# Patient Record
Sex: FEMALE | Race: WHITE | NOT HISPANIC OR LATINO | Employment: OTHER | URBAN - METROPOLITAN AREA
[De-identification: names, ages, dates, MRNs, and addresses within clinical notes are randomized per-mention and may not be internally consistent; named-entity substitution may affect disease eponyms.]

---

## 2019-05-20 ENCOUNTER — CONSULT (OUTPATIENT)
Dept: CARDIOLOGY | Facility: CLINIC | Age: 79
End: 2019-05-20

## 2019-05-20 VITALS
DIASTOLIC BLOOD PRESSURE: 64 MMHG | WEIGHT: 200 LBS | HEIGHT: 60 IN | BODY MASS INDEX: 39.27 KG/M2 | HEART RATE: 71 BPM | SYSTOLIC BLOOD PRESSURE: 122 MMHG

## 2019-05-20 DIAGNOSIS — I65.22 CAROTID ARTERY STENOSIS, SYMPTOMATIC, LEFT: ICD-10-CM

## 2019-05-20 DIAGNOSIS — I65.23 CAROTID ARTERY STENOSIS, ASYMPTOMATIC, BILATERAL: ICD-10-CM

## 2019-05-20 DIAGNOSIS — I48.0 PAROXYSMAL ATRIAL FIBRILLATION (HCC): Primary | ICD-10-CM

## 2019-05-20 DIAGNOSIS — I25.10 CORONARY ARTERY DISEASE INVOLVING NATIVE HEART, ANGINA PRESENCE UNSPECIFIED, UNSPECIFIED VESSEL OR LESION TYPE: Primary | ICD-10-CM

## 2019-05-20 PROCEDURE — 99204 OFFICE O/P NEW MOD 45 MIN: CPT | Performed by: INTERNAL MEDICINE

## 2019-05-20 PROCEDURE — 93000 ELECTROCARDIOGRAM COMPLETE: CPT | Performed by: INTERNAL MEDICINE

## 2019-05-20 RX ORDER — FOLIC ACID 1 MG/1
1 TABLET ORAL 2 TIMES DAILY
COMMUNITY

## 2019-05-20 RX ORDER — ATORVASTATIN CALCIUM 10 MG/1
10 TABLET, FILM COATED ORAL DAILY
Qty: 30 TABLET | Refills: 11 | Status: SHIPPED | OUTPATIENT
Start: 2019-05-20 | End: 2020-06-01

## 2019-05-20 RX ORDER — PREDNISONE 1 MG/1
5 TABLET ORAL DAILY
COMMUNITY

## 2019-05-20 RX ORDER — LANOLIN ALCOHOL/MO/W.PET/CERES
100 CREAM (GRAM) TOPICAL DAILY
COMMUNITY

## 2019-05-20 RX ORDER — LANOLIN ALCOHOL/MO/W.PET/CERES
5000 CREAM (GRAM) TOPICAL DAILY
COMMUNITY

## 2019-05-20 RX ORDER — TRIAMTERENE AND HYDROCHLOROTHIAZIDE 37.5; 25 MG/1; MG/1
1 TABLET ORAL DAILY
COMMUNITY
End: 2020-08-06 | Stop reason: HOSPADM

## 2019-05-20 RX ORDER — FLECAINIDE ACETATE 50 MG/1
50 TABLET ORAL EVERY 12 HOURS
COMMUNITY
End: 2019-07-02 | Stop reason: SDUPTHER

## 2019-05-20 NOTE — PROGRESS NOTES
Subjective:     Encounter Date:05/20/2019    Primary Care Physician: Jed Harris MD      Patient ID: Avnai Argueta is a 78 y.o. female.    Chief Complaint:Atrial Fibrillation (Consult)    PROBLEM LIST:  1. Paroxysmal atrial fibrillation  a. Diagnosed 2016, CHADSVASC- 4  b. 2016 cardiac cath with near normal coronaries.  EF of 65%.  c. Flecainide and Xarelto since 2016  2. RBBB noted 2017  3. Carotid artery disease  a. Right CEA  4. Hypertension  5. Dyslipidemia  6. Skin cancer with removal  7. GERD  8. Arthritis  9. Brite's disease  10. Temporal arteritis  11. Surgeries:  a. Cholecystectomy  b. Total hysterectomy       Allergies   Allergen Reactions   • Penicillins Rash         Current Outpatient Medications:   •  ASTAXANTHIN PO, Take  by mouth Daily., Disp: , Rfl:   •  flecainide (TAMBOCOR) 50 MG tablet, Take 50 mg by mouth Every 12 (Twelve) Hours., Disp: , Rfl:   •  folic acid (FOLVITE) 1 MG tablet, Take 1 mg by mouth 2 (Two) Times a Day., Disp: , Rfl:   •  KRILL OIL PO, Take  by mouth Daily., Disp: , Rfl:   •  LOSARTAN POTASSIUM PO, Take 50 mg by mouth Daily., Disp: , Rfl:   •  Multiple Vitamins-Iron (CHLORELLA PO), Take  by mouth Daily., Disp: , Rfl:   •  Multiple Vitamins-Minerals (MULTIVITAMIN ADULT PO), Take  by mouth Daily., Disp: , Rfl:   •  predniSONE (DELTASONE) 5 MG tablet, Take 5 mg by mouth Daily., Disp: , Rfl:   •  rivaroxaban (XARELTO) 20 MG tablet, Take 20 mg by mouth Daily., Disp: , Rfl:   •  triamterene-hydrochlorothiazide (MAXZIDE-25) 37.5-25 MG per tablet, Take 1 tablet by mouth Daily., Disp: , Rfl:   •  UBIQUINOL PO, Take  by mouth Daily., Disp: , Rfl:   •  vitamin B-12 (CYANOCOBALAMIN) 1000 MCG tablet, Take 5,000 mcg by mouth Daily., Disp: , Rfl:   •  vitamin B-6 (PYRIDOXINE) 50 MG tablet, Take 100 mg by mouth Daily., Disp: , Rfl:         History of Present Illness    Patient is a 78-year-old  female who we are seeing today for establishment of cardiac care secondary to history  of paroxysmal atrial fibrillation.  This was diagnosed in 2016.  Patient notes that since that time she has been maintained on flecainide and Xarelto therapy.  She denies any recurrent atrial fibrillation.  She denies any chest pain, pressure, tightness.  She denies any increased shortness of breath.  She does note lower extremity edema which was progressively worse throughout the day.  She also notes a significant amount of fluid intake.  She denies any syncope or near syncope but did have an episode of room spinning.  This lasted for a little while and then eventually resolved.  She notes that her previous cardiologist Dr. Cervantes has retired and she discussed with her primary care physician establishing care here again.    The following portions of the patient's history were reviewed and updated as appropriate: allergies, current medications, past family history, past medical history, past social history, past surgical history and problem list.    Family History   Problem Relation Age of Onset   • Stroke Mother    • Diabetes Mother    • Heart disease Father    • Alcohol abuse Father    • Heart disease Sister        Social History     Tobacco Use   • Smoking status: Never Smoker   • Smokeless tobacco: Never Used   Substance Use Topics   • Alcohol use: No     Frequency: Never   • Drug use: No         Review of Systems   Constitution: Positive for malaise/fatigue. Negative for fever and weakness.   HENT: Positive for hearing loss and tinnitus. Negative for nosebleeds.    Eyes: Negative for redness and visual disturbance.   Cardiovascular: Positive for leg swelling. Negative for orthopnea, palpitations and paroxysmal nocturnal dyspnea.   Respiratory: Positive for cough, shortness of breath and snoring. Negative for sputum production and wheezing.    Endocrine: Positive for heat intolerance.   Hematologic/Lymphatic: Negative for bleeding problem.   Skin: Negative for flushing, itching and rash.   Musculoskeletal:  "Positive for arthritis. Negative for falls, joint pain and muscle cramps.   Gastrointestinal: Positive for heartburn. Negative for abdominal pain, diarrhea, nausea and vomiting.   Genitourinary: Negative for hematuria.   Neurological: Negative for excessive daytime sleepiness, dizziness, headaches and tremors.   Psychiatric/Behavioral: Negative for substance abuse. The patient is not nervous/anxious.           Objective:    height is 152.4 cm (60\") and weight is 90.7 kg (200 lb). Her blood pressure is 122/64 and her pulse is 71.         Physical Exam   Constitutional: She is oriented to person, place, and time. She appears well-developed and well-nourished.   HENT:   Head: Normocephalic and atraumatic.   Mouth/Throat: Oropharynx is clear and moist.   Eyes: Conjunctivae are normal. Pupils are equal, round, and reactive to light.   Neck: Normal carotid pulses and no JVD present. Carotid bruit is not present. No thyromegaly present.   Cardiovascular: Normal rate, regular rhythm, S1 normal and S2 normal. Exam reveals no gallop and no friction rub.   No murmur heard.  Pulses:       Carotid pulses are 2+ on the right side, and 2+ on the left side.       Dorsalis pedis pulses are 2+ on the right side, and 2+ on the left side.        Posterior tibial pulses are 2+ on the right side, and 2+ on the left side.   Pulmonary/Chest: No respiratory distress. She has no wheezes. She has no rales. She exhibits no tenderness.   Abdominal: She exhibits no distension, no abdominal bruit and no mass. There is no hepatosplenomegaly. There is no tenderness. There is no rebound.   Musculoskeletal: She exhibits edema. She exhibits no tenderness or deformity.   Lymphadenopathy:     She has no cervical adenopathy.   Neurological: She is alert and oriented to person, place, and time. She has normal strength.   Skin: Skin is warm and dry. No rash noted. No cyanosis. Nails show no clubbing.   Psychiatric: She has a normal mood and affect. " Cognition and memory are normal.         ECG 12 Lead  Date/Time: 5/20/2019 12:58 PM  Performed by: Owen Olivera MD  Authorized by: Owen Olivera MD   Rhythm: sinus rhythm  Conduction: right bundle branch block                  Assessment:   Assessment/Plan      Avani was seen today for atrial fibrillation.    Diagnoses and all orders for this visit:    Paroxysmal atrial fibrillation (CMS/HCC)    Carotid artery disease    Other orders  -     ECG 12 Lead      1.  Paroxysmal atrial fibrillation.  Well-controlled on flecainide and chronically anticoagulated with Xarelto.  Asymptomatic.  2.  Hypertension well-controlled on losartan  3.  Carotid artery disease status post right CEA.  Carotid duplex last year showing 50 to 69% asymptomatic left ICA.  4.  Dyslipidemia not on statin (prefers not to take prescription medicines).  5.  Lower extremity edema    Recommendations #1 continue current medications for atrial fibrillation hypertension  2.  Check carotid duplex prior to next visit.  Will follow for now.  No indications for revascularization as of yet.  3.  Dyslipidemia untreated last LDL cholesterol 170 with known vascular disease.  Long discussion today regarding the numerous studies showing stroke and MI mortality benefit with statins.  We will have her start atorvastatin 10 mg nightly, which she is agreeable to..  Also had a long discussion regarding dietary modifications, and the likelihood of success,  4.  Lower extremity edema, appears to be due to her significant free water intake.  Instead of increasing diuretics, we will simply decrease her p.o. intake.  No evidence of heart failure    Tana ELDER scribed portions of this dictation for Dr. Owen Olivera.  I, Owen Olivera MD, personally performed the services described in this documentation as scribed by the above individual in my presence, and it is both accurate and complete    Dictated utilizing Dragon dictation

## 2019-07-02 RX ORDER — FLECAINIDE ACETATE 50 MG/1
50 TABLET ORAL EVERY 12 HOURS
Qty: 90 TABLET | Refills: 3 | Status: SHIPPED | OUTPATIENT
Start: 2019-07-02 | End: 2020-01-24

## 2020-01-24 RX ORDER — FLECAINIDE ACETATE 50 MG/1
50 TABLET ORAL EVERY 12 HOURS
Qty: 180 TABLET | Refills: 1 | Status: SHIPPED | OUTPATIENT
Start: 2020-01-24 | End: 2020-07-21

## 2020-01-24 RX ORDER — FLECAINIDE ACETATE 50 MG/1
TABLET ORAL
Qty: 90 TABLET | Refills: 2 | Status: SHIPPED | OUTPATIENT
Start: 2020-01-24 | End: 2020-01-24 | Stop reason: SDUPTHER

## 2020-02-24 DIAGNOSIS — I65.23 CAROTID ARTERY STENOSIS, ASYMPTOMATIC, BILATERAL: Primary | ICD-10-CM

## 2020-06-01 ENCOUNTER — HOSPITAL ENCOUNTER (OUTPATIENT)
Dept: CARDIOLOGY | Facility: HOSPITAL | Age: 80
Discharge: HOME | End: 2020-06-01
Admitting: INTERNAL MEDICINE

## 2020-06-01 ENCOUNTER — OFFICE VISIT (OUTPATIENT)
Dept: CARDIOLOGY | Facility: CLINIC | Age: 80
End: 2020-06-01

## 2020-06-01 VITALS
DIASTOLIC BLOOD PRESSURE: 56 MMHG | BODY MASS INDEX: 38.48 KG/M2 | HEIGHT: 60 IN | SYSTOLIC BLOOD PRESSURE: 130 MMHG | WEIGHT: 196 LBS | HEART RATE: 64 BPM | OXYGEN SATURATION: 96 %

## 2020-06-01 VITALS — HEIGHT: 60 IN | BODY MASS INDEX: 39.27 KG/M2 | WEIGHT: 200 LBS

## 2020-06-01 DIAGNOSIS — M79.89 LEG SWELLING: ICD-10-CM

## 2020-06-01 DIAGNOSIS — I10 ESSENTIAL HYPERTENSION: ICD-10-CM

## 2020-06-01 DIAGNOSIS — I48.0 PAROXYSMAL ATRIAL FIBRILLATION (HCC): Primary | ICD-10-CM

## 2020-06-01 DIAGNOSIS — E78.5 DYSLIPIDEMIA: ICD-10-CM

## 2020-06-01 DIAGNOSIS — I65.23 CAROTID ARTERY STENOSIS, ASYMPTOMATIC, BILATERAL: ICD-10-CM

## 2020-06-01 DIAGNOSIS — I77.9 RIGHT-SIDED CAROTID ARTERY DISEASE, UNSPECIFIED TYPE (HCC): ICD-10-CM

## 2020-06-01 PROBLEM — K21.9 GERD (GASTROESOPHAGEAL REFLUX DISEASE): Status: ACTIVE | Noted: 2020-06-01

## 2020-06-01 PROBLEM — M19.90 ARTHRITIS: Status: ACTIVE | Noted: 2020-06-01

## 2020-06-01 PROBLEM — I45.10 RBBB: Status: ACTIVE | Noted: 2020-06-01

## 2020-06-01 LAB
BH CV XLRA MEAS LEFT DIST CCA EDV: 10.8 CM/SEC
BH CV XLRA MEAS LEFT DIST CCA PSV: 77.1 CM/SEC
BH CV XLRA MEAS LEFT DIST ICA EDV: 15.3 CM/SEC
BH CV XLRA MEAS LEFT DIST ICA PSV: 66.8 CM/SEC
BH CV XLRA MEAS LEFT ICA/CCA RATIO: 2.04
BH CV XLRA MEAS LEFT MID CCA EDV: 11.8 CM/SEC
BH CV XLRA MEAS LEFT MID CCA PSV: 92.3 CM/SEC
BH CV XLRA MEAS LEFT MID ICA EDV: 13.5 CM/SEC
BH CV XLRA MEAS LEFT MID ICA PSV: 64.6 CM/SEC
BH CV XLRA MEAS LEFT PROX CCA EDV: 11.9 CM/SEC
BH CV XLRA MEAS LEFT PROX CCA PSV: 117.3 CM/SEC
BH CV XLRA MEAS LEFT PROX ECA EDV: 0.79 CM/SEC
BH CV XLRA MEAS LEFT PROX ECA PSV: 98.2 CM/SEC
BH CV XLRA MEAS LEFT PROX ICA EDV: 25.5 CM/SEC
BH CV XLRA MEAS LEFT PROX ICA PSV: 188 CM/SEC
BH CV XLRA MEAS LEFT PROX SCLA EDV: 0.7 CM/SEC
BH CV XLRA MEAS LEFT PROX SCLA PSV: 114.5 CM/SEC
BH CV XLRA MEAS LEFT VERTEBRAL A EDV: 16.6 CM/SEC
BH CV XLRA MEAS LEFT VERTEBRAL A PSV: 76.8 CM/SEC
BH CV XLRA MEAS RIGHT DIST CCA EDV: 10.1 CM/SEC
BH CV XLRA MEAS RIGHT DIST CCA PSV: 54.4 CM/SEC
BH CV XLRA MEAS RIGHT DIST ICA EDV: 26.5 CM/SEC
BH CV XLRA MEAS RIGHT DIST ICA PSV: 118 CM/SEC
BH CV XLRA MEAS RIGHT ICA/CCA RATIO: 1.09
BH CV XLRA MEAS RIGHT MID CCA EDV: 11.8 CM/SEC
BH CV XLRA MEAS RIGHT MID CCA PSV: 69.2 CM/SEC
BH CV XLRA MEAS RIGHT MID ICA EDV: 16.2 CM/SEC
BH CV XLRA MEAS RIGHT MID ICA PSV: 75.5 CM/SEC
BH CV XLRA MEAS RIGHT PROX CCA EDV: 11.9 CM/SEC
BH CV XLRA MEAS RIGHT PROX CCA PSV: 92.4 CM/SEC
BH CV XLRA MEAS RIGHT PROX ECA EDV: 66.2 CM/SEC
BH CV XLRA MEAS RIGHT PROX ECA PSV: 725 CM/SEC
BH CV XLRA MEAS RIGHT PROX ICA EDV: 10.9 CM/SEC
BH CV XLRA MEAS RIGHT PROX ICA PSV: 73.3 CM/SEC
BH CV XLRA MEAS RIGHT PROX SCLA EDV: 1.3 CM/SEC
BH CV XLRA MEAS RIGHT PROX SCLA PSV: 228.8 CM/SEC
BH CV XLRA MEAS RIGHT VERTEBRAL A EDV: 9.8 CM/SEC
BH CV XLRA MEAS RIGHT VERTEBRAL A PSV: 72.3 CM/SEC
RIGHT ARM BP: NORMAL MMHG

## 2020-06-01 PROCEDURE — 93880 EXTRACRANIAL BILAT STUDY: CPT | Performed by: INTERNAL MEDICINE

## 2020-06-01 PROCEDURE — 93880 EXTRACRANIAL BILAT STUDY: CPT

## 2020-06-01 PROCEDURE — 99214 OFFICE O/P EST MOD 30 MIN: CPT | Performed by: INTERNAL MEDICINE

## 2020-06-01 PROCEDURE — 93000 ELECTROCARDIOGRAM COMPLETE: CPT | Performed by: INTERNAL MEDICINE

## 2020-06-01 RX ORDER — CURCUMIN 100 %
1 POWDER (GRAM) MISCELLANEOUS DAILY
COMMUNITY

## 2020-06-01 NOTE — PROGRESS NOTES
[facility]  Subjective:     Encounter Date:06/01/2020      Patient ID: Avani Argueta is a  79 y.o. female.    Chief Complaint: Atrial Fibrillation      PROBLEM LIST:  1. Paroxysmal atrial fibrillation:  a. Diagnosed 2016, CHADSVASC- 4 (HTN, Age >75, Female)  b. [facility], 04/13/2016, Dr. Love: Near normal coronaries. EF 65%.  c. Flecainide and Xarelto since 2016.  2. RBBB, noted 2017  3. Carotid artery disease:  a. Right CEA in 2012.  b. Carotid duplex, 06/01/2020 right 0 to 49%, left 50 to 69%.  4. Hypertension  5. Dyslipidemia  6. Skin cancer with removal  7. GERD  8. Arthritis  9. Brite's disease  10. Temporal arteritis with chronic prednisone therapy.  11. Surgeries:  a. Cholecystectomy.  b. Total hysterectomy.   c. Right CEA 2012.    History of Present Illness  Avani Argueta returns today for an annual follow up with a history of paroxysmal atrial fibrillation, carotid disease, and cardiac risk factors. Since last visit, he has been feeling well overall from a cardiovascular standpoint. She does report occasional high heart rates due to anxiety. Patient denies any awareness of atrial fibrillation. She feels that she would recognize the feeling immediately. Patient denies chest pain, shortness of breath, dizziness, and syncope. Patient takes Maxzide in the late afternoon for her edema. She has not swelling when she first wakes up in the morning, but it starts in the mid morning and gradually worsens throughout the day.    Allergies   Allergen Reactions   • Penicillins Rash         Current Outpatient Medications:   •  ASTAXANTHIN PO, Take  by mouth Daily., Disp: , Rfl:   •  flecainide (TAMBOCOR) 50 MG tablet, Take 1 tablet by mouth Every 12 (Twelve) Hours., Disp: 180 tablet, Rfl: 1  •  folic acid (FOLVITE) 1 MG tablet, Take 1 mg by mouth 2 (Two) Times a Day., Disp: , Rfl:   •  KRILL OIL PO, Take 2 capsules by mouth Daily., Disp: , Rfl:   •  LOSARTAN POTASSIUM PO, Take 50  mg by mouth Daily., Disp: , Rfl:   •  Multiple Vitamins-Iron (CHLORELLA PO), Take 5 tablets by mouth Daily., Disp: , Rfl:   •  Multiple Vitamins-Minerals (MULTIVITAMIN ADULT PO), Take  by mouth Daily., Disp: , Rfl:   •  predniSONE (DELTASONE) 5 MG tablet, Take 5 mg by mouth Daily., Disp: , Rfl:   •  Probiotic Product (PROBIOTIC PO), Take 1 tablet by mouth Daily., Disp: , Rfl:   •  rivaroxaban (XARELTO) 20 MG tablet, Take 1 tablet by mouth Daily., Disp: 90 tablet, Rfl: 3  •  triamterene-hydrochlorothiazide (MAXZIDE-25) 37.5-25 MG per tablet, Take 1 tablet by mouth Daily., Disp: , Rfl:   •  Turmeric, Curcuma Longa, (CURCUMIN) powder, 1 tablet Daily. 1 tablet daily, Disp: , Rfl:   •  UBIQUINOL PO, Take  by mouth Daily., Disp: , Rfl:   •  Unable to find, 1 each Daily. Med Name: complete spare restore, Disp: , Rfl:   •  vitamin B-12 (CYANOCOBALAMIN) 1000 MCG tablet, Take 5,000 mcg by mouth Daily., Disp: , Rfl:   •  vitamin B-6 (PYRIDOXINE) 50 MG tablet, Take 100 mg by mouth Daily., Disp: , Rfl:     The following portions of the patient's history were reviewed and updated as appropriate: allergies, current medications, past family history, past medical history, past social history, past surgical history and problem list.    Review of Systems   Constitution: Negative. Negative for malaise/fatigue.   Eyes: Negative for vision loss in left eye and vision loss in right eye.   Cardiovascular: Positive for leg swelling and palpitations. Negative for chest pain, dyspnea on exertion, near-syncope, orthopnea, paroxysmal nocturnal dyspnea and syncope.   Respiratory: Negative.    Hematologic/Lymphatic: Negative for bleeding problem. Does not bruise/bleed easily.   Skin: Negative for rash.   Musculoskeletal: Negative for muscle weakness and myalgias.   Gastrointestinal: Negative for heartburn, nausea and vomiting.   Neurological: Negative for brief paralysis, excessive daytime sleepiness, focal weakness, numbness, paresthesias and  "weakness.          Objective:     Vitals:    06/01/20 1138   BP: 130/56   BP Location: Right arm   Patient Position: Sitting   Pulse: 64   SpO2: 96%   Weight: 88.9 kg (196 lb)   Height: 152.4 cm (60\")         Physical Exam   Constitutional: She is oriented to person, place, and time. She appears well-developed and well-nourished.   HENT:   Mouth/Throat: Oropharynx is clear and moist.   Neck: No JVD present. Carotid bruit is not present. No thyromegaly present.   Cardiovascular: Regular rhythm, S1 normal, S2 normal, normal heart sounds and intact distal pulses. Exam reveals no gallop, no S3 and no S4.   No murmur heard.  Pulses:       Carotid pulses are 2+ on the right side, and 2+ on the left side.       Radial pulses are 2+ on the right side, and 2+ on the left side.   Pulmonary/Chest: Breath sounds normal.   Abdominal: Soft. Bowel sounds are normal. She exhibits no mass. There is no tenderness.   Musculoskeletal: She exhibits no edema.   Neurological: She is alert and oriented to person, place, and time.   Skin: Skin is warm and dry. No rash noted.       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    Diagnoses and all orders for this visit:    Paroxysmal atrial fibrillation (CMS/HCC)  -     ECG 12 Lead    Right-sided carotid artery disease, unspecified type (CMS/HCC)    Essential hypertension    Dyslipidemia    Leg " swelling        Impression:  1. Paroxysmal atrial fibrillation, maintaining sinus rhythm on flecainide 50 mg BID. On Xarelto 20 mg for stroke prophylaxis.  2. Carotid disease, s/p right carotid endarterectomy in 2012. Carotid duplex from today showed moderate left disease and widely patent previous right CEA  3. Hypertension, well-controlled on current medications.  4. Dyslipidemia, monitored by PCP.  5. Leg swelling, on Maxzide 37.5-25 mg daily.    Plan:  1. Stable cardiac status.   2. Compression stockings recommended for leg swelling.  3. Carotid artery disease, stable asymptomatic.  Would like for her to resume statin.  4. Continue current medications.  5. Revisit in 12 MO, or sooner as needed.    Scribed for Owen Olivera MD by Chioma Foster. 6/1/2020  12:11    Owen Olivera MD      Please note that portions of this note may have been completed with a voice recognition program. Efforts were made to edit the dictations, but occasionally words are mistranscribed.

## 2020-07-21 RX ORDER — RIVAROXABAN 20 MG/1
TABLET, FILM COATED ORAL
Qty: 90 TABLET | Refills: 2 | Status: SHIPPED | OUTPATIENT
Start: 2020-07-21 | End: 2021-04-20

## 2020-07-21 RX ORDER — FLECAINIDE ACETATE 50 MG/1
TABLET ORAL
Qty: 180 TABLET | Refills: 0 | Status: SHIPPED | OUTPATIENT
Start: 2020-07-21 | End: 2020-10-29

## 2020-08-03 ENCOUNTER — HOSPITAL ENCOUNTER (OUTPATIENT)
Facility: HOSPITAL | Age: 80
Setting detail: OBSERVATION
Discharge: HOME | End: 2020-08-06
Attending: EMERGENCY MEDICINE | Admitting: INTERNAL MEDICINE

## 2020-08-03 ENCOUNTER — APPOINTMENT (OUTPATIENT)
Dept: GENERAL RADIOLOGY | Facility: HOSPITAL | Age: 80
End: 2020-08-03

## 2020-08-03 ENCOUNTER — APPOINTMENT (OUTPATIENT)
Dept: CT IMAGING | Facility: HOSPITAL | Age: 80
End: 2020-08-03

## 2020-08-03 DIAGNOSIS — Z74.09 IMPAIRED MOBILITY AND ADLS: ICD-10-CM

## 2020-08-03 DIAGNOSIS — Z78.9 IMPAIRED MOBILITY AND ADLS: ICD-10-CM

## 2020-08-03 DIAGNOSIS — H53.9 VISION CHANGES: ICD-10-CM

## 2020-08-03 DIAGNOSIS — I16.0 HYPERTENSIVE URGENCY: Primary | ICD-10-CM

## 2020-08-03 DIAGNOSIS — R42 DIZZINESS: ICD-10-CM

## 2020-08-03 LAB
ALBUMIN SERPL-MCNC: 4.2 G/DL (ref 3.5–5.2)
ALBUMIN/GLOB SERPL: 1.7 G/DL
ALP SERPL-CCNC: 77 U/L (ref 39–117)
ALT SERPL W P-5'-P-CCNC: 24 U/L (ref 1–33)
ANION GAP SERPL CALCULATED.3IONS-SCNC: 11 MMOL/L (ref 5–15)
AST SERPL-CCNC: 38 U/L (ref 1–32)
BACTERIA UR QL AUTO: ABNORMAL /HPF
BASOPHILS # BLD AUTO: 0.06 10*3/MM3 (ref 0–0.2)
BASOPHILS NFR BLD AUTO: 0.7 % (ref 0–1.5)
BILIRUB SERPL-MCNC: 0.6 MG/DL (ref 0–1.2)
BILIRUB UR QL STRIP: NEGATIVE
BUN SERPL-MCNC: 15 MG/DL (ref 8–23)
BUN/CREAT SERPL: 17.4 (ref 7–25)
CALCIUM SPEC-SCNC: 10.1 MG/DL (ref 8.6–10.5)
CHLORIDE SERPL-SCNC: 93 MMOL/L (ref 98–107)
CLARITY UR: ABNORMAL
CO2 SERPL-SCNC: 28 MMOL/L (ref 22–29)
COLOR UR: YELLOW
CREAT SERPL-MCNC: 0.86 MG/DL (ref 0.57–1)
DEPRECATED RDW RBC AUTO: 45.3 FL (ref 37–54)
EOSINOPHIL # BLD AUTO: 0.07 10*3/MM3 (ref 0–0.4)
EOSINOPHIL NFR BLD AUTO: 0.8 % (ref 0.3–6.2)
ERYTHROCYTE [DISTWIDTH] IN BLOOD BY AUTOMATED COUNT: 13.4 % (ref 12.3–15.4)
GFR SERPL CREATININE-BSD FRML MDRD: 63 ML/MIN/1.73
GLOBULIN UR ELPH-MCNC: 2.5 GM/DL
GLUCOSE SERPL-MCNC: 118 MG/DL (ref 65–99)
GLUCOSE UR STRIP-MCNC: NEGATIVE MG/DL
HCT VFR BLD AUTO: 45.8 % (ref 34–46.6)
HGB BLD-MCNC: 15.8 G/DL (ref 12–15.9)
HGB UR QL STRIP.AUTO: NEGATIVE
HOLD SPECIMEN: NORMAL
HOLD SPECIMEN: NORMAL
HYALINE CASTS UR QL AUTO: ABNORMAL /LPF
IMM GRANULOCYTES # BLD AUTO: 0.03 10*3/MM3 (ref 0–0.05)
IMM GRANULOCYTES NFR BLD AUTO: 0.3 % (ref 0–0.5)
KETONES UR QL STRIP: NEGATIVE
LEUKOCYTE ESTERASE UR QL STRIP.AUTO: ABNORMAL
LYMPHOCYTES # BLD AUTO: 2.89 10*3/MM3 (ref 0.7–3.1)
LYMPHOCYTES NFR BLD AUTO: 32.6 % (ref 19.6–45.3)
MAGNESIUM SERPL-MCNC: 2.3 MG/DL (ref 1.6–2.4)
MCH RBC QN AUTO: 31.5 PG (ref 26.6–33)
MCHC RBC AUTO-ENTMCNC: 34.5 G/DL (ref 31.5–35.7)
MCV RBC AUTO: 91.2 FL (ref 79–97)
MONOCYTES # BLD AUTO: 0.89 10*3/MM3 (ref 0.1–0.9)
MONOCYTES NFR BLD AUTO: 10 % (ref 5–12)
NEUTROPHILS NFR BLD AUTO: 4.93 10*3/MM3 (ref 1.7–7)
NEUTROPHILS NFR BLD AUTO: 55.6 % (ref 42.7–76)
NITRITE UR QL STRIP: NEGATIVE
NRBC BLD AUTO-RTO: 0 /100 WBC (ref 0–0.2)
PH UR STRIP.AUTO: 8 [PH] (ref 5–8)
PLATELET # BLD AUTO: 242 10*3/MM3 (ref 140–450)
PMV BLD AUTO: 10.3 FL (ref 6–12)
POTASSIUM SERPL-SCNC: 3.7 MMOL/L (ref 3.5–5.2)
PROT SERPL-MCNC: 6.7 G/DL (ref 6–8.5)
PROT UR QL STRIP: NEGATIVE
RBC # BLD AUTO: 5.02 10*6/MM3 (ref 3.77–5.28)
RBC # UR: ABNORMAL /HPF
REF LAB TEST METHOD: ABNORMAL
SODIUM SERPL-SCNC: 132 MMOL/L (ref 136–145)
SP GR UR STRIP: 1.01 (ref 1–1.03)
SQUAMOUS #/AREA URNS HPF: ABNORMAL /HPF
TROPONIN T SERPL-MCNC: <0.01 NG/ML (ref 0–0.03)
UROBILINOGEN UR QL STRIP: ABNORMAL
WBC # BLD AUTO: 8.87 10*3/MM3 (ref 3.4–10.8)
WBC UR QL AUTO: ABNORMAL /HPF
WHOLE BLOOD HOLD SPECIMEN: NORMAL
WHOLE BLOOD HOLD SPECIMEN: NORMAL

## 2020-08-03 PROCEDURE — 96365 THER/PROPH/DIAG IV INF INIT: CPT

## 2020-08-03 PROCEDURE — 84484 ASSAY OF TROPONIN QUANT: CPT | Performed by: EMERGENCY MEDICINE

## 2020-08-03 PROCEDURE — 70498 CT ANGIOGRAPHY NECK: CPT

## 2020-08-03 PROCEDURE — 99285 EMERGENCY DEPT VISIT HI MDM: CPT

## 2020-08-03 PROCEDURE — 0 IOPAMIDOL PER 1 ML: Performed by: EMERGENCY MEDICINE

## 2020-08-03 PROCEDURE — 85025 COMPLETE CBC W/AUTO DIFF WBC: CPT | Performed by: EMERGENCY MEDICINE

## 2020-08-03 PROCEDURE — 87086 URINE CULTURE/COLONY COUNT: CPT | Performed by: FAMILY MEDICINE

## 2020-08-03 PROCEDURE — 70450 CT HEAD/BRAIN W/O DYE: CPT

## 2020-08-03 PROCEDURE — 93005 ELECTROCARDIOGRAM TRACING: CPT | Performed by: EMERGENCY MEDICINE

## 2020-08-03 PROCEDURE — 96375 TX/PRO/DX INJ NEW DRUG ADDON: CPT

## 2020-08-03 PROCEDURE — 70496 CT ANGIOGRAPHY HEAD: CPT

## 2020-08-03 PROCEDURE — 83735 ASSAY OF MAGNESIUM: CPT | Performed by: EMERGENCY MEDICINE

## 2020-08-03 PROCEDURE — 25010000002 ONDANSETRON PER 1 MG: Performed by: EMERGENCY MEDICINE

## 2020-08-03 PROCEDURE — 80053 COMPREHEN METABOLIC PANEL: CPT | Performed by: EMERGENCY MEDICINE

## 2020-08-03 PROCEDURE — 81001 URINALYSIS AUTO W/SCOPE: CPT | Performed by: EMERGENCY MEDICINE

## 2020-08-03 PROCEDURE — 71045 X-RAY EXAM CHEST 1 VIEW: CPT

## 2020-08-03 RX ORDER — ONDANSETRON 2 MG/ML
8 INJECTION INTRAMUSCULAR; INTRAVENOUS ONCE
Status: COMPLETED | OUTPATIENT
Start: 2020-08-03 | End: 2020-08-03

## 2020-08-03 RX ORDER — TRIAMTERENE AND HYDROCHLOROTHIAZIDE 37.5; 25 MG/1; MG/1
1 TABLET ORAL ONCE
Status: COMPLETED | OUTPATIENT
Start: 2020-08-03 | End: 2020-08-03

## 2020-08-03 RX ORDER — LOSARTAN POTASSIUM 50 MG/1
50 TABLET ORAL ONCE
Status: COMPLETED | OUTPATIENT
Start: 2020-08-03 | End: 2020-08-03

## 2020-08-03 RX ORDER — ONDANSETRON 2 MG/ML
8 INJECTION INTRAMUSCULAR; INTRAVENOUS ONCE
Status: DISCONTINUED | OUTPATIENT
Start: 2020-08-03 | End: 2020-08-03

## 2020-08-03 RX ORDER — SODIUM CHLORIDE 0.9 % (FLUSH) 0.9 %
10 SYRINGE (ML) INJECTION AS NEEDED
Status: DISCONTINUED | OUTPATIENT
Start: 2020-08-03 | End: 2020-08-06 | Stop reason: HOSPADM

## 2020-08-03 RX ADMIN — LOSARTAN POTASSIUM 50 MG: 50 TABLET, FILM COATED ORAL at 22:46

## 2020-08-03 RX ADMIN — ONDANSETRON 8 MG: 2 INJECTION INTRAMUSCULAR; INTRAVENOUS at 23:15

## 2020-08-03 RX ADMIN — IOPAMIDOL 75 ML: 755 INJECTION, SOLUTION INTRAVENOUS at 22:41

## 2020-08-03 RX ADMIN — TRIAMTERENE AND HYDROCHLOROTHIAZIDE 1 TABLET: 37.5; 25 TABLET ORAL at 22:45

## 2020-08-03 RX ADMIN — NICARDIPINE HYDROCHLORIDE 5 MG/HR: 0.1 INJECTION, SOLUTION INTRAVENOUS at 23:34

## 2020-08-04 ENCOUNTER — APPOINTMENT (OUTPATIENT)
Dept: CARDIOLOGY | Facility: HOSPITAL | Age: 80
End: 2020-08-04

## 2020-08-04 ENCOUNTER — APPOINTMENT (OUTPATIENT)
Dept: MRI IMAGING | Facility: HOSPITAL | Age: 80
End: 2020-08-04

## 2020-08-04 PROBLEM — M31.6 TEMPORAL ARTERITIS (HCC): Status: ACTIVE | Noted: 2020-08-04

## 2020-08-04 PROBLEM — I16.0 HYPERTENSIVE URGENCY: Status: ACTIVE | Noted: 2020-08-04

## 2020-08-04 PROBLEM — R42 DIZZINESS: Status: ACTIVE | Noted: 2020-08-04

## 2020-08-04 LAB
ANION GAP SERPL CALCULATED.3IONS-SCNC: 14 MMOL/L (ref 5–15)
BUN SERPL-MCNC: 19 MG/DL (ref 8–23)
BUN/CREAT SERPL: 21.3 (ref 7–25)
CALCIUM SPEC-SCNC: 10.2 MG/DL (ref 8.6–10.5)
CHLORIDE SERPL-SCNC: 93 MMOL/L (ref 98–107)
CHOLEST SERPL-MCNC: 220 MG/DL (ref 0–200)
CO2 SERPL-SCNC: 24 MMOL/L (ref 22–29)
CREAT SERPL-MCNC: 0.89 MG/DL (ref 0.57–1)
CRP SERPL-MCNC: 0.26 MG/DL (ref 0–0.5)
DEPRECATED RDW RBC AUTO: 45.2 FL (ref 37–54)
ERYTHROCYTE [DISTWIDTH] IN BLOOD BY AUTOMATED COUNT: 13.4 % (ref 12.3–15.4)
ERYTHROCYTE [SEDIMENTATION RATE] IN BLOOD: 24 MM/HR (ref 0–30)
GFR SERPL CREATININE-BSD FRML MDRD: 61 ML/MIN/1.73
GLUCOSE BLDC GLUCOMTR-MCNC: 111 MG/DL (ref 70–130)
GLUCOSE BLDC GLUCOMTR-MCNC: 112 MG/DL (ref 70–130)
GLUCOSE SERPL-MCNC: 138 MG/DL (ref 65–99)
HBA1C MFR BLD: 5.4 % (ref 4.8–5.6)
HCT VFR BLD AUTO: 47.5 % (ref 34–46.6)
HDLC SERPL-MCNC: 78 MG/DL (ref 40–60)
HGB BLD-MCNC: 16.1 G/DL (ref 12–15.9)
LDLC SERPL CALC-MCNC: 125 MG/DL (ref 0–100)
LDLC/HDLC SERPL: 1.61 {RATIO}
MCH RBC QN AUTO: 30.9 PG (ref 26.6–33)
MCHC RBC AUTO-ENTMCNC: 33.9 G/DL (ref 31.5–35.7)
MCV RBC AUTO: 91.2 FL (ref 79–97)
PLATELET # BLD AUTO: 248 10*3/MM3 (ref 140–450)
PMV BLD AUTO: 10.7 FL (ref 6–12)
POTASSIUM SERPL-SCNC: 3.7 MMOL/L (ref 3.5–5.2)
RBC # BLD AUTO: 5.21 10*6/MM3 (ref 3.77–5.28)
SODIUM SERPL-SCNC: 131 MMOL/L (ref 136–145)
TRIGL SERPL-MCNC: 83 MG/DL (ref 0–150)
TSH SERPL DL<=0.05 MIU/L-ACNC: 0.62 UIU/ML (ref 0.27–4.2)
VLDLC SERPL-MCNC: 16.6 MG/DL
WBC # BLD AUTO: 12.75 10*3/MM3 (ref 3.4–10.8)

## 2020-08-04 PROCEDURE — 84443 ASSAY THYROID STIM HORMONE: CPT | Performed by: PHYSICIAN ASSISTANT

## 2020-08-04 PROCEDURE — 83036 HEMOGLOBIN GLYCOSYLATED A1C: CPT | Performed by: PHYSICIAN ASSISTANT

## 2020-08-04 PROCEDURE — 97530 THERAPEUTIC ACTIVITIES: CPT

## 2020-08-04 PROCEDURE — G0378 HOSPITAL OBSERVATION PER HR: HCPCS

## 2020-08-04 PROCEDURE — 93306 TTE W/DOPPLER COMPLETE: CPT | Performed by: INTERNAL MEDICINE

## 2020-08-04 PROCEDURE — 97162 PT EVAL MOD COMPLEX 30 MIN: CPT

## 2020-08-04 PROCEDURE — 92523 SPEECH SOUND LANG COMPREHEN: CPT

## 2020-08-04 PROCEDURE — 80048 BASIC METABOLIC PNL TOTAL CA: CPT | Performed by: PHYSICIAN ASSISTANT

## 2020-08-04 PROCEDURE — 99220 PR INITIAL OBSERVATION CARE/DAY 70 MINUTES: CPT | Performed by: INTERNAL MEDICINE

## 2020-08-04 PROCEDURE — 96366 THER/PROPH/DIAG IV INF ADDON: CPT

## 2020-08-04 PROCEDURE — 96375 TX/PRO/DX INJ NEW DRUG ADDON: CPT

## 2020-08-04 PROCEDURE — 25010000002 HYDRALAZINE PER 20 MG: Performed by: NURSE PRACTITIONER

## 2020-08-04 PROCEDURE — 85652 RBC SED RATE AUTOMATED: CPT | Performed by: INTERNAL MEDICINE

## 2020-08-04 PROCEDURE — 86140 C-REACTIVE PROTEIN: CPT | Performed by: STUDENT IN AN ORGANIZED HEALTH CARE EDUCATION/TRAINING PROGRAM

## 2020-08-04 PROCEDURE — 93880 EXTRACRANIAL BILAT STUDY: CPT | Performed by: INTERNAL MEDICINE

## 2020-08-04 PROCEDURE — 63710000001 PREDNISONE PER 5 MG: Performed by: PHYSICIAN ASSISTANT

## 2020-08-04 PROCEDURE — 97165 OT EVAL LOW COMPLEX 30 MIN: CPT

## 2020-08-04 PROCEDURE — 85027 COMPLETE CBC AUTOMATED: CPT | Performed by: PHYSICIAN ASSISTANT

## 2020-08-04 PROCEDURE — 82962 GLUCOSE BLOOD TEST: CPT

## 2020-08-04 PROCEDURE — 93306 TTE W/DOPPLER COMPLETE: CPT

## 2020-08-04 PROCEDURE — 93880 EXTRACRANIAL BILAT STUDY: CPT

## 2020-08-04 PROCEDURE — 99214 OFFICE O/P EST MOD 30 MIN: CPT | Performed by: STUDENT IN AN ORGANIZED HEALTH CARE EDUCATION/TRAINING PROGRAM

## 2020-08-04 PROCEDURE — 70551 MRI BRAIN STEM W/O DYE: CPT

## 2020-08-04 PROCEDURE — 97535 SELF CARE MNGMENT TRAINING: CPT

## 2020-08-04 PROCEDURE — 80061 LIPID PANEL: CPT | Performed by: PHYSICIAN ASSISTANT

## 2020-08-04 RX ORDER — LOSARTAN POTASSIUM 25 MG/1
25 TABLET ORAL 2 TIMES DAILY
Status: DISCONTINUED | OUTPATIENT
Start: 2020-08-04 | End: 2020-08-05

## 2020-08-04 RX ORDER — SODIUM CHLORIDE 0.9 % (FLUSH) 0.9 %
10 SYRINGE (ML) INJECTION EVERY 12 HOURS SCHEDULED
Status: DISCONTINUED | OUTPATIENT
Start: 2020-08-04 | End: 2020-08-06 | Stop reason: HOSPADM

## 2020-08-04 RX ORDER — PREDNISONE 1 MG/1
5 TABLET ORAL
Status: DISCONTINUED | OUTPATIENT
Start: 2020-08-04 | End: 2020-08-06 | Stop reason: HOSPADM

## 2020-08-04 RX ORDER — ONDANSETRON 2 MG/ML
4 INJECTION INTRAMUSCULAR; INTRAVENOUS EVERY 6 HOURS PRN
Status: DISCONTINUED | OUTPATIENT
Start: 2020-08-04 | End: 2020-08-06 | Stop reason: HOSPADM

## 2020-08-04 RX ORDER — LOSARTAN POTASSIUM 25 MG/1
25 TABLET ORAL
Status: DISCONTINUED | OUTPATIENT
Start: 2020-08-04 | End: 2020-08-04

## 2020-08-04 RX ORDER — ONDANSETRON 4 MG/1
4 TABLET, FILM COATED ORAL EVERY 6 HOURS PRN
Status: DISCONTINUED | OUTPATIENT
Start: 2020-08-04 | End: 2020-08-06 | Stop reason: HOSPADM

## 2020-08-04 RX ORDER — ATORVASTATIN CALCIUM 40 MG/1
80 TABLET, FILM COATED ORAL NIGHTLY
Status: DISCONTINUED | OUTPATIENT
Start: 2020-08-04 | End: 2020-08-06 | Stop reason: HOSPADM

## 2020-08-04 RX ORDER — FLECAINIDE ACETATE 50 MG/1
50 TABLET ORAL EVERY 12 HOURS SCHEDULED
Status: DISCONTINUED | OUTPATIENT
Start: 2020-08-04 | End: 2020-08-06 | Stop reason: HOSPADM

## 2020-08-04 RX ORDER — HYDRALAZINE HYDROCHLORIDE 20 MG/ML
10 INJECTION INTRAMUSCULAR; INTRAVENOUS ONCE
Status: COMPLETED | OUTPATIENT
Start: 2020-08-04 | End: 2020-08-04

## 2020-08-04 RX ORDER — FOLIC ACID 1 MG/1
1 TABLET ORAL 2 TIMES DAILY
Status: DISCONTINUED | OUTPATIENT
Start: 2020-08-04 | End: 2020-08-06 | Stop reason: HOSPADM

## 2020-08-04 RX ORDER — SODIUM CHLORIDE 0.9 % (FLUSH) 0.9 %
10 SYRINGE (ML) INJECTION AS NEEDED
Status: DISCONTINUED | OUTPATIENT
Start: 2020-08-04 | End: 2020-08-06 | Stop reason: HOSPADM

## 2020-08-04 RX ORDER — LOSARTAN POTASSIUM 50 MG/1
50 TABLET ORAL
Status: DISCONTINUED | OUTPATIENT
Start: 2020-08-04 | End: 2020-08-04

## 2020-08-04 RX ORDER — ASPIRIN 81 MG/1
324 TABLET, CHEWABLE ORAL ONCE
Status: COMPLETED | OUTPATIENT
Start: 2020-08-04 | End: 2020-08-04

## 2020-08-04 RX ADMIN — ATORVASTATIN CALCIUM 80 MG: 40 TABLET, FILM COATED ORAL at 20:38

## 2020-08-04 RX ADMIN — RIVAROXABAN 20 MG: 20 TABLET, FILM COATED ORAL at 18:14

## 2020-08-04 RX ADMIN — FLECAINIDE ACETATE TABLET 50 MG: 50 TABLET ORAL at 08:38

## 2020-08-04 RX ADMIN — NICARDIPINE HYDROCHLORIDE 5 MG/HR: 0.1 INJECTION, SOLUTION INTRAVENOUS at 04:13

## 2020-08-04 RX ADMIN — ASPIRIN 324 MG: 81 TABLET, CHEWABLE ORAL at 04:10

## 2020-08-04 RX ADMIN — SODIUM CHLORIDE, PRESERVATIVE FREE 10 ML: 5 INJECTION INTRAVENOUS at 20:42

## 2020-08-04 RX ADMIN — FOLIC ACID 1 MG: 1 TABLET ORAL at 08:38

## 2020-08-04 RX ADMIN — PREDNISONE 5 MG: 5 TABLET ORAL at 08:38

## 2020-08-04 RX ADMIN — FOLIC ACID 1 MG: 1 TABLET ORAL at 20:38

## 2020-08-04 RX ADMIN — SODIUM CHLORIDE, PRESERVATIVE FREE 10 ML: 5 INJECTION INTRAVENOUS at 08:41

## 2020-08-04 RX ADMIN — HYDRALAZINE HYDROCHLORIDE 10 MG: 20 INJECTION INTRAMUSCULAR; INTRAVENOUS at 20:41

## 2020-08-04 RX ADMIN — FLECAINIDE ACETATE TABLET 50 MG: 50 TABLET ORAL at 20:37

## 2020-08-04 RX ADMIN — LOSARTAN POTASSIUM 25 MG: 50 TABLET, FILM COATED ORAL at 10:56

## 2020-08-04 RX ADMIN — LOSARTAN POTASSIUM 25 MG: 25 TABLET, FILM COATED ORAL at 22:25

## 2020-08-04 NOTE — THERAPY EVALUATION
Patient Name: Avani Argueta  : 1940    MRN: [id number]                              Today's Date: 2020       Admit Date: 8/3/2020    Visit Dx:     ICD-10-CM ICD-9-CM   1. Hypertensive urgency I16.0 401.9   2. Dizziness R42 780.4   3. Vision changes H53.9 368.9     Patient Active Problem List   Diagnosis   • Paroxysmal atrial fibrillation (CMS/HCC)   • RBBB   • Carotid artery disease (CMS/HCC)   • Essential hypertension   • Dyslipidemia   • GERD (gastroesophageal reflux disease)   • Arthritis   • Hypertensive urgency   • Dizziness   • Temporal arteritis (CMS/HCC)     Past Medical History:   Diagnosis Date   • Acid reflux    • Arthritis    • Kidney disorder      Past Surgical History:   Procedure Laterality Date   • GALLBLADDER SURGERY     • HYSTERECTOMY       General Information     [facility] Name 2029          PT Evaluation Time/Intention    Document Type  evaluation  (Pended)   -     Mode of Treatment  physical therapy  (Pended)   -[facility] Name 20          General Information    Patient Profile Reviewed?  yes  (Pended)   -     Prior Level of Function  independent:;all household mobility;community mobility;transfer;bed mobility;ADL's;min assist:;cooking;cleaning  (Pended)  Decreased ability to elevate B arms due to shoulder OA  -     Existing Precautions/Restrictions  fall  (Pended)   -     Barriers to Rehab  none identified  (Pended)   -[facility] Name 20          Relationship/Environment    Lives With  spouse  (Pended)   -[facility] Name 20          Resource/Environmental Concerns    Current Living Arrangements  home/apartment/condo  (Pended)   -[facility] Name 20          Home Main Entrance    Number of Stairs, Main Entrance  none  (Pended)   -[facility] Name 20          Stairs Within Home, Primary    Number of Stairs, Within Home, Primary  none  (Pended)   -[facility] Name 20          Cognitive Assessment/Intervention-  PT/OT    Orientation Status (Cognition)  oriented x 4  (Pended)   -JG     Cognitive Assessment/Intervention Comment  Alert and follows commands.  (Pended)   -     Row Name 08/04/20 0929          Safety Issues, Functional Mobility    Safety Issues Affecting Function (Mobility)  insight into deficits/self awareness  (Pended)   -JG     Impairments Affecting Function (Mobility)  balance;endurance/activity tolerance;sensation/sensory awareness  (Pended)   -JG       User Key  (r) = Recorded By, (t) = Taken By, (c) = Cosigned By    Initials Name Provider Type    JG Milly Woodard, PT Student PT Student        Mobility     Row Name 08/04/20 0930          Bed Mobility Assessment/Treatment    Bed Mobility Assessment/Treatment  scooting/bridging;supine-sit  (Pended)   -J     Scooting/Bridging [place] (Bed Mobility)  conditional independence  (Pended)   -J     Supine-Sit [place] (Bed Mobility)  verbal cues;conditional independence  (Pended)  VC for use of hand rail  -J     Assistive Device (Bed Mobility)  bed rails;head of bed elevated  (Pended)   -JG     Comment (Bed Mobility)  Pt demonstrated appropriate control and techniques  (Pended)   -     Row Name 08/04/20 0930          Transfer Assessment/Treatment    Comment (Transfers)  2 STS performed (1 from bed, 1 from toilet) with SBA  (Pended)   -     Row Name 08/04/20 0930          Sit-Stand Transfer    Sit-Stand [place] (Transfers)  stand by assist  (Pended)   -JG     Assistive Device (Sit-Stand Transfers)  other (see comments)  (Pended)  None  -JG     Row Name 08/04/20 0930          Gait/Stairs Assessment/Training    Gait/Stairs Assessment/Training  gait/ambulation independence  (Pended)   -JG     [place] Level (Gait)  stand by assist  (Pended)   -JG     Assistive Device (Gait)  other (see comments)  (Pended)  None  -JG     Distance in Feet (Gait)  250 ft  (Pended)   -JG     Pattern (Gait)  step-through  (Pended)   -JG     Deviations/Abnormal  Patterns (Gait)  robby decreased;stride length decreased;base of support, wide;bilateral deviations  (Pended)   -JG     Bilateral Gait Deviations  forward flexed posture  (Pended)   -JG     [place] Level (Stairs)  not tested  (Pended)   -JG     Comment (Gait/Stairs)  Pt ambulated ~ 250 ft with SBA and demonstrated no loss of balance or knee buckling. She does have increased lateral postural sway, but maintains appropriate control throughout ambulation.  (Pended)   -JG       User Key  (r) = Recorded By, (t) = Taken By, (c) = Cosigned By    Initials Name Provider Type    J Milly Woodard, PT Student PT Student        Obj/Interventions     Row Name 08/04/20 0933          General ROM    GENERAL ROM COMMENTS  B LE: WFL  (Pended)   -     Row Name 08/04/20 0933          MMT (Manual Muscle Testing)    General MMT Comments  B LE: Grossly 4/5  (Pended)   -     Row Name 08/04/20 0933          Static Sitting Balance    Level of [place] (Unsupported Sitting, Static Balance)  conditional independence  (Pended)   -JG     Sitting Position (Unsupported Sitting, Static Balance)  sitting on edge of bed  (Pended)   -JG     Time Able to Maintain Position (Unsupported Sitting, Static Balance)  1 to 2 minutes  (Pended)   -JG     Comment (Unsupported Sitting, Static Balance)  B feet on floor for stability. No loss of balance observed  (Pended)   -JG     Row Name 08/04/20 0933          Dynamic Sitting Balance    Level of [place], Reaches Outside Midline (Sitting, Dynamic Balance)  conditional independence  (Pended)   -JG     Sitting Position, Reaches Outside Midline (Sitting, Dynamic Balance)  sitting on edge of bed  (Pended)   -JG     Comment, Reaches Outside Midline (Sitting, Dynamic Balance)  B LE on floor with unilateral UE support when donning gown.  (Pended)   -     Row Name 08/04/20 0933          Static Standing Balance    Level of [place] (Supported Standing, Static Balance)  standby assist  (Pended)    -JG     Time Able to Maintain Position (Supported Standing, Static Balance)  15 to 30 seconds  (Pended)   -JG     Comment (Supported Standing, Static Balance)  No loss of balance observed  (Pended)   -     Row Name 08/04/20 0933          Dynamic Standing Balance    Level of [place], Reaches Outside Midline (Standing, Dynamic Balance)  standby assist  (Pended)   -JG     Time Able to Maintain Position, Reaches Outside Midline (Standing, Dynamic Balance)  45 to 60 seconds  (Pended)   -JG     Comment, Reaches Outside Midline (Standing, Dynamic Balance)  Dynamic anterior reaching while washing hands. No loss of balance observed.  (Pended)   -     Row Name 08/04/20 0933          Sensory Assessment/Intervention    Sensory General Assessment  --  (Pended)  Pt indicates decreased sensation in B great toes that has caused her to occassionally stumble while walking. Able to detect light touch throughout B LE.  -JG       User Key  (r) = Recorded By, (t) = Taken By, (c) = Cosigned By    Initials Name Provider Type    NITIN Milly Woodard, PT Student PT Student        Goals/Plan     Row Name 08/04/20 0944          Bed Mobility Goal 1 (PT)    Activity/Assistive Device (Bed Mobility Goal 1, PT)  rolling to left;rolling to right;sit to supine/supine to sit  (Pended)   -JG     [place] Level/Cues Needed (Bed Mobility Goal 1, PT)  independent  (Pended)   -JG     Time Frame (Bed Mobility Goal 1, PT)  long term goal (LTG);10 days  (Pended)   -     Row Name 08/04/20 0944          Transfer Goal 1 (PT)    Activity/Assistive Device (Transfer Goal 1, PT)  sit-to-stand/stand-to-sit;bed-to-chair/chair-to-bed  (Pended)   -JG     [place] Level/Cues Needed (Transfer Goal 1, PT)  conditional independence  (Pended)   -JG     Time Frame (Transfer Goal 1, PT)  long term goal (LTG);10 days  (Pended)   -     Row Name 08/04/20 0944          Gait Training Goal 1 (PT)    Activity/Assistive Device (Gait Training Goal 1, PT)  gait  (walking locomotion)  (Pended)   -JG     [place] Level (Gait Training Goal 1, PT)  standby assist  (Pended)   -JG     Distance (Gait Goal 1, PT)  300 ft  (Pended)   -JG     Time Frame (Gait Training Goal 1, PT)  long term goal (LTG);10 days  (Pended)   -JG     Row Name 08/04/20 0944          Patient Education Goal (PT)    Activity (Patient Education Goal, PT)  Pt educated about HEP  (Pended)   -JG     [place]/Cues/Accuracy (Memory Goal 2, PT)  demonstrates adequately;independent;verbalizes understanding  (Pended)   -JG     Time Frame (Patient Education Goal, PT)  long term goal (LTG);10 days  (Pended)   -JG       User Key  (r) = Recorded By, (t) = Taken By, (c) = Cosigned By    Initials Name Provider Type    NITING Milly Woodard, PT Student PT Student        Clinical Impression     Row Name 08/04/20 0937          Pain Assessment    Additional Documentation  Pain Scale: Numbers Pre/Post-Treatment (Group)  (Pended)   -     Row Name 08/04/20 0937          Pain Scale: Numbers Pre/Post-Treatment    Pain Scale: Numbers, Pretreatment  0/10 - no pain  (Pended)   -JG     Pain Scale: Numbers, Post-Treatment  0/10 - no pain  (Pended)   -JG     Pre/Post Treatment Pain Comment  Pt tolerated activities appropriatley  (Pended)   -JG     Pain Intervention(s)  Repositioned;Ambulation/increased activity  (Pended)   -     Row Name 08/04/20 4768          Plan of Care Review    Plan of Care Reviewed With  patient  (Pended)   -JG     Outcome Summary  PT evaluation completed. Pt demonstrated appropriate functional mobility, but was limited due to hypertensive BP. She ambulated 250 ft with SBA, with no experience of loss of balance or knee buckling. Pt's BP was elevated following her walk (194/62), but she experiended no signs or symptoms. Pt will be followed by PT services to assess BP tolerance of activties for safe discharge. Discharge recommendation is home with assist.  (Pended)   -     Row Name 08/04/20 0986           Physical Therapy Clinical Impression    Patient/Family Goals Statement (PT Clinical Impression)  To return home safely  (Pended)   -JG     Criteria for Skilled Interventions Met (PT Clinical Impression)  treatment indicated;yes  (Pended)   -JG     Rehab Potential (PT Clinical Summary)  good, to achieve stated therapy goals  (Pended)   -JG     Predicted Duration of Therapy (PT)  10 days  (Pended)   -JG     Row Name 08/04/20 0937          Vital Signs    Pre Systolic BP Rehab  180  (Pended)   -JG     Pre Treatment Diastolic BP  70  (Pended)   -JG     Post Systolic BP Rehab  194  (Pended)   -JG     Post Treatment Diastolic BP  62  (Pended)   -JG     Pretreatment Heart Rate (beats/min)  74  (Pended)   -JG     Posttreatment Heart Rate (beats/min)  89  (Pended)   -JG     Pre SpO2 (%)  97  (Pended)   -JG     O2 Delivery Pre Treatment  room air  (Pended)   -JG     Row Name 08/04/20 0937          Positioning and Restraints    Pre-Treatment Position  in bed  (Pended)   -JG     Post Treatment Position  bed  (Pended)   -JG     In Bed  notified nsg;sitting EOB;call light within reach;with other staff  (Pended)  Pt with [facility] staff  -JG       User Key  (r) = Recorded By, (t) = Taken By, (c) = Cosigned By    Initials Name Provider Type    Milly Butler, PT Student PT Student        Outcome Measures     Row Name 08/04/20 0945          How much help from another person do you currently need...    Turning from your back to your side while in flat bed without using bedrails?  4  (Pended)   -JG     Moving from lying on back to sitting on the side of a flat bed without bedrails?  3  (Pended)   -JG     Moving to and from a bed to a chair (including a wheelchair)?  4  (Pended)   -JG     Standing up from a chair using your arms (e.g., wheelchair, bedside chair)?  4  (Pended)   -JG     Climbing 3-5 steps with a railing?  3  (Pended)   -JG     To walk in hospital room?  4  (Pended)   -JG     AM-PAC 6 Clicks Score (PT)  22  (Pended)    -TYRON     Row Name 08/04/20 0945          Modified [place] Scale    Modified [place] Scale  1 - No significant disability despite symptoms.  Able to carry out all usual duties and activities.  (Pended)   -TYRON     Row Name 08/04/20 0945          Functional Assessment    Outcome Measure Options  AM-PAC 6 Clicks Basic Mobility (PT);Modified [place]  (Pended)   -NITINCARLA       User Key  (r) = Recorded By, (t) = Taken By, (c) = Cosigned By    Initials Name Provider Type    Milly Butler, PT Student PT Student        Physical Therapy Education                 Title: PT OT SLP Therapies (Done)     Topic: Physical Therapy (Done)     Point: Mobility training (Done)     Description:   Instruct learner(s) on safety and technique for assisting patient out of bed, chair or wheelchair.  Instruct in the proper use of assistive devices, such as walker, crutches, cane or brace.              Patient Friendly Description:   It's important to get you on your feet again, but we need to do so in a way that is safe for you. Falling has serious consequences, and your personal safety is the most important thing of all.        When it's time to get out of bed, one of us or a family member will sit next to you on the bed to give you support.     If your doctor or nurse tells you to use a walker, crutches, a cane, or a brace, be sure you use it every time you get out of bed, even if you think you don't need it.    Learning Progress Summary           Patient Acceptance, E, VU by TYRON at 8/4/2020 0855    Comment:  Pt educated about transfer training and plan of care.                   Point: Home exercise program (Done)     Description:   Instruct learner(s) on appropriate technique for monitoring, assisting and/or progressing patient with therapeutic exercises and activities.              Learning Progress Summary           Patient Acceptance, E, VU by TYRON at 8/4/2020 0855    Comment:  Pt educated about transfer training and plan of care.                    Point: Body mechanics (Done)     Description:   Instruct learner(s) on proper positioning and spine alignment for patient and/or caregiver during mobility tasks and/or exercises.              Learning Progress Summary           Patient Acceptance, E, VU by  at 8/4/2020 0855    Comment:  Pt educated about transfer training and plan of care.                   Point: Precautions (Done)     Description:   Instruct learner(s) on prescribed precautions during mobility and gait tasks              Learning Progress Summary           Patient Acceptance, E, VU by  at 8/4/2020 0855    Comment:  Pt educated about transfer training and plan of care.                               User Key     Initials Effective Dates Name Provider Type Discipline     05/14/20 -  Milly Woodard, PT Student PT Student PT              PT Recommendation and Plan  Planned Therapy Interventions (PT Eval): (P) balance training, bed mobility training, gait training, home exercise program, patient/family education, strengthening, stretching, transfer training  Outcome Summary/Treatment Plan (PT)  Anticipated Discharge Disposition (PT): (P) home with assist  Plan of Care Reviewed With: (P) patient  Outcome Summary: (P) PT evaluation completed. Pt demonstrated appropriate functional mobility, but was limited due to hypertensive BP. She ambulated 250 ft with SBA, with no experience of loss of balance or knee buckling. Pt's BP was elevated following her walk (194/62), but she experiended no signs or symptoms. Pt will be followed by PT services to assess BP tolerance of activties for safe discharge. Discharge recommendation is home with assist.     Time Calculation:   PT Charges     Row Name 08/04/20 0951             Time Calculation    Start Time  0855  (Pended)   -      PT Received On  08/04/20  (Pended)   -      PT Goal Re-Cert Due Date  08/14/20  (Pended)   -         Time Calculation- PT    Total Timed Code Minutes- PT  10 minute(s)   (Pended)   -JG         Timed Charges    00769 - PT Therapeutic Activity Minutes  10  (Pended)   -        User Key  (r) = Recorded By, (t) = Taken By, (c) = Cosigned By    Initials Name Provider Type    Milly Butler, PT Student PT Student        Therapy Charges for Today     Code Description Service Date Service Provider Modifiers Qty    [id number]  PT THERAPEUTIC ACT EA 15 MIN 8/4/2020 Milly Woodard, PT Student GP 1    [id number]  PT EVAL MOD COMPLEXITY 4 8/4/2020 Milly Woodard, PT Student GP 1          PT G-Codes  Outcome Measure Options: (P) AM-PAC 6 Clicks Basic Mobility (PT), Modified [place]  AM-PAC 6 Clicks Score (PT): (P) 22  Modified [place] Scale: (P) 1 - No significant disability despite symptoms.  Able to carry out all usual duties and activities.    Milly Woodard, PT Student  8/4/2020

## 2020-08-04 NOTE — PLAN OF CARE
NIH, 0; BP's much improved although still elevated with systolic's from 140's-180's this shift; up with standby assistance; pt voices no needs or concerns at this time  Problem: Patient Care Overview  Goal: Plan of Care Review  Outcome: Ongoing (interventions implemented as appropriate)  Flowsheets (Taken 8/4/2020 3406)  Progress: improving  Plan of Care Reviewed With: patient; spouse

## 2020-08-04 NOTE — PROGRESS NOTES
Stroke Progress Note       Chief Complaint:  dizziness    Subjective    Subjective     Subjective:  Headache resolved     Review of Systems   Constitutional: No fatigue  HENT: Negative for nosebleeds and rhinorrhea.    Eyes: Negative for redness.   Respiratory: Negative for cough.    Gastrointestinal: Negative for anal bleeding.   Endocrine: Negative for polydipsia.   Genitourinary: Negative for enuresis and urgency.   Musculoskeletal: Negative for joint swelling.   Neurological: Negative for tremors.   Psychiatric/Behavioral: Negative for hallucinations.     Objective      Temp:  [98 °F (36.7 °C)-98.2 °F (36.8 °C)] 98 °F (36.7 °C)  Heart Rate:  [78-96] 78  Resp:  [16] 16  BP: (136-231)/() 180/60          GEN: NAD, pleasant, cooperative  Eyes-show anicteric sclera, moist conjunctiva with no lid lag, no redness  Neck-trachea midline.  There is no thyromegaly.  ENMT-oropharynx clear with mucous membranes and good dentition.  Skin-no rash, lesions or ulcers.  Cardiovascular exam-no pedal edema, regular rate and rhythm.  CHEST: No signs of resp distress, on room air  Abdomen-no abdominal distention, nontender.  Psychiatric exam-alert oriented x3 with intact judgment and insight      NEURO    MENTAL STATUS: AAOx3, memory intact, fund of knowledge appropriate    LANG/SPEECH: Naming and repetition intact, fluent, follows 3-step commands    CRANIAL NERVES:      II: Pupils equal and reactive, no RAPD, no VF deficits, fundus(not done)      III, IV, VI: EOM intact, no gaze preference or deviation, no nystagmus.      V: normal sensation in V1, V2, and V3 segments bilaterally      VII: no asymmetry, no nasolabial fold flattening      VIII: normal hearing to speech      IX, X: normal palatal elevation, no uvular deviation      XI: 5/5 head turn and 5/5 shoulder shrug bilaterally      XII: midline tongue protrusion    MOTOR:  Normal tone through out  5/5 muscle power in Rt shoulder abductors/adductors, elbow  flexors/extensors, wrist flexors/extensors, finger abductors/adductors.  5/5 in Rt hip flexors/extensors, knee flexors/extensors, ankle dorsiflexors and planter flexors.    5/5 muscle power in Lt shoulder abductors/adductors, elbow flexors/extensors, wrist flexors/extensors, finger abductors/adductors.  5/5 in Lt hip flexors/extensors, knee flexors/extensors, ankle dorsiflexors and planter flexors.    REFLEXES:  no Il's, no clonus    SENSORY:    Normal to light touch all through out     COORDINATION: Normal finger to nose and heel to shin, no tremor, no dysmetria    STATION: Not assessed due to patient condition    GAIT: Not assessed due to patient condition             Results Review:    I reviewed the patient's new clinical results.    Lab Results (last 24 hours)     Procedure Component Value Units Date/Time    POC Glucose Once [[id number]]  (Normal) Collected:  08/04/20 1137    Specimen:  Blood Updated:  08/04/20 1144     Glucose 111 mg/dL     Urine Culture - Urine, Urine, Clean Catch [[id number]] Collected:  08/03/20 2217    Specimen:  Urine, Clean Catch Updated:  08/04/20 1045    Hemoglobin A1c [[id number]]  (Normal) Collected:  08/04/20 0446    Specimen:  Blood Updated:  08/04/20 0940     Hemoglobin A1C 5.40 %     Narrative:       Hemoglobin A1C Ranges:    Increased Risk for Diabetes  5.7% to 6.4%  Diabetes                     >= 6.5%  Diabetic Goal                < 7.0%    Sedimentation Rate [[id number]]  (Normal) Collected:  08/04/20 0446    Specimen:  Blood Updated:  08/04/20 0710     Sed Rate 24 mm/hr     POC Glucose Once [[id number]]  (Normal) Collected:  08/04/20 0705    Specimen:  Blood Updated:  08/04/20 0707     Glucose 112 mg/dL     Basic Metabolic Panel [[id number]]  (Abnormal) Collected:  08/04/20 0446    Specimen:  Blood Updated:  08/04/20 0703     Glucose 138 mg/dL      BUN 19 mg/dL      Creatinine 0.89 mg/dL      Sodium 131 mmol/L      Potassium 3.7 mmol/L      Comment: Specimen hemolyzed.   Results may be affected.        Chloride 93 mmol/L      CO2 24.0 mmol/L      Calcium 10.2 mg/dL      eGFR Non African Amer 61 mL/min/1.73      BUN/Creatinine Ratio 21.3     Anion Gap 14.0 mmol/L     Narrative:       GFR Normal >60  Chronic Kidney Disease <60  Kidney Failure <15      Lipid Panel [[id number]]  (Abnormal) Collected:  08/04/20 0446    Specimen:  Blood Updated:  08/04/20 0703     Total Cholesterol 220 mg/dL      Triglycerides 83 mg/dL      HDL Cholesterol 78 mg/dL      LDL Cholesterol  125 mg/dL      VLDL Cholesterol 16.6 mg/dL      LDL/HDL Ratio 1.61    Narrative:       Cholesterol Reference Ranges  (U.S. Department of Health and Human Services ATP III Classifications)    Desirable          <200 mg/dL  Borderline High    200-239 mg/dL  High Risk          >240 mg/dL      Triglyceride Reference Ranges  (U.S. Department of Health and Human Services ATP III Classifications)    Normal           <150 mg/dL  Borderline High  150-199 mg/dL  High             200-499 mg/dL  Very High        >500 mg/dL    HDL Reference Ranges  (U.S. Department of Health and Human Services ATP III Classifcations)    Low     <40 mg/dl (major risk factor for CHD)  High    >60 mg/dl ('negative' risk factor for CHD)        LDL Reference Ranges  (U.S. Department of Health and Human Services ATP III Classifcations)    Optimal          <100 mg/dL  Near Optimal     100-129 mg/dL  Borderline High  130-159 mg/dL  High             160-189 mg/dL  Very High        >189 mg/dL    TSH [[id number]]  (Normal) Collected:  08/04/20 0446    Specimen:  Blood Updated:  08/04/20 0702     TSH 0.621 uIU/mL     CBC (No Diff) [[id number]]  (Abnormal) Collected:  08/04/20 0446    Specimen:  Blood Updated:  08/04/20 0626     WBC 12.75 10*3/mm3      RBC 5.21 10*6/mm3      Hemoglobin 16.1 g/dL      Hematocrit 47.5 %      MCV 91.2 fL      MCH 30.9 pg      MCHC 33.9 g/dL      RDW 13.4 %      RDW-SD 45.2 fl      MPV 10.7 fL      Platelets 248 10*3/mm3     Urinalysis,  Microscopic Only - Urine, Clean Catch [[id number]]  (Abnormal) Collected:  08/03/20 2217    Specimen:  Urine, Clean Catch Updated:  08/03/20 2233     RBC, UA 3-6 /HPF      WBC, UA 6-12 /HPF      Bacteria, UA None Seen /HPF      Squamous Epithelial Cells, UA 3-6 /HPF      Hyaline Casts, UA 0-6 /LPF      Methodology Automated Microscopy    Urinalysis With Microscopic If Indicated (No Culture) - Urine, Clean Catch [[id number]]  (Abnormal) Collected:  08/03/20 2217    Specimen:  Urine, Clean Catch Updated:  08/03/20 2233     Color, UA Yellow     Appearance, UA Turbid     pH, UA 8.0     Specific Gravity, UA 1.015     Glucose, UA Negative     Ketones, UA Negative     Bilirubin, UA Negative     Blood, UA Negative     Protein, UA Negative     Leuk Esterase, UA Trace     Nitrite, UA Negative     Urobilinogen, UA 0.2 E.U./dL    [place] Draw [[id number]] Collected:  08/03/20 2128    Specimen:  Blood Updated:  08/03/20 2230    Narrative:       The following orders were created for panel order [place] Draw.  Procedure                               Abnormality         Status                     ---------                               -----------         ------                     Light Blue Top[[id number]]                                   Final result               Green Top (Gel)[[id number]]                                  Final result               Lavender Top[[id number]]                                     Final result               Gold Top - SST[[id number]]                                   Final result                 Please view results for these tests on the individual orders.    Light Blue Top [[id number]] Collected:  08/03/20 2128    Specimen:  Blood Updated:  08/03/20 2230     Extra Tube hold for add-on     Comment: Auto resulted       Green Top (Gel) [[id number]] Collected:  08/03/20 2128    Specimen:  Blood Updated:  08/03/20 2230     Extra Tube Hold for add-ons.     Comment: Auto resulted.       Lavender Top [[id number]]  Collected:  08/03/20 2128    Specimen:  Blood Updated:  08/03/20 2230     Extra Tube hold for add-on     Comment: Auto resulted       Gold Top - SST [[id number]] Collected:  08/03/20 2128    Specimen:  Blood Updated:  08/03/20 2230     Extra Tube Hold for add-ons.     Comment: Auto resulted.       Troponin [[id number]]  (Normal) Collected:  08/03/20 2128    Specimen:  Blood Updated:  08/03/20 2226     Troponin T <0.010 ng/mL      Comment: Specimen hemolyzed.  Results may be affected.       Narrative:       Troponin T Reference Range:  <= 0.03 ng/mL-   Negative for AMI  >0.03 ng/mL-     Abnormal for myocardial necrosis.  Clinicians would have to utilize clinical acumen, EKG, Troponin and serial changes to determine if it is an Acute Myocardial Infarction or myocardial injury due to an underlying chronic condition.       Results may be falsely decreased if patient taking Biotin.      Comprehensive Metabolic Panel [[id number]]  (Abnormal) Collected:  08/03/20 2128    Specimen:  Blood Updated:  08/03/20 2226     Glucose 118 mg/dL      BUN 15 mg/dL      Creatinine 0.86 mg/dL      Sodium 132 mmol/L      Potassium 3.7 mmol/L      Comment: Specimen hemolyzed.  Results may be affected.        Chloride 93 mmol/L      CO2 28.0 mmol/L      Calcium 10.1 mg/dL      Total Protein 6.7 g/dL      Albumin 4.20 g/dL      ALT (SGPT) 24 U/L      Comment: Specimen hemolyzed.  Results may be affected.        AST (SGOT) 38 U/L      Alkaline Phosphatase 77 U/L      Total Bilirubin 0.6 mg/dL      eGFR Non African Amer 63 mL/min/1.73      Globulin 2.5 gm/dL      A/G Ratio 1.7 g/dL      BUN/Creatinine Ratio 17.4     Anion Gap 11.0 mmol/L     Narrative:       GFR Normal >60  Chronic Kidney Disease <60  Kidney Failure <15      Magnesium [[id number]]  (Normal) Collected:  08/03/20 2128    Specimen:  Blood Updated:  08/03/20 2225     Magnesium 2.3 mg/dL     CBC & Differential [[id number]] Collected:  08/03/20 2128    Specimen:  Blood Updated:  08/03/20  2203    Narrative:       The following orders were created for panel order CBC & Differential.  Procedure                               Abnormality         Status                     ---------                               -----------         ------                     CBC Auto Differential[[id number]]        Normal              Final result                 Please view results for these tests on the individual orders.    CBC Auto Differential [[id number]]  (Normal) Collected:  08/03/20 2128    Specimen:  Blood Updated:  08/03/20 2203     WBC 8.87 10*3/mm3      RBC 5.02 10*6/mm3      Hemoglobin 15.8 g/dL      Hematocrit 45.8 %      MCV 91.2 fL      MCH 31.5 pg      MCHC 34.5 g/dL      RDW 13.4 %      RDW-SD 45.3 fl      MPV 10.3 fL      Platelets 242 10*3/mm3      Neutrophil % 55.6 %      Lymphocyte % 32.6 %      Monocyte % 10.0 %      Eosinophil % 0.8 %      Basophil % 0.7 %      Immature Grans % 0.3 %      Neutrophils, Absolute 4.93 10*3/mm3      Lymphocytes, Absolute 2.89 10*3/mm3      Monocytes, Absolute 0.89 10*3/mm3      Eosinophils, Absolute 0.07 10*3/mm3      Basophils, Absolute 0.06 10*3/mm3      Immature Grans, Absolute 0.03 10*3/mm3      nRBC 0.0 /100 WBC         Ct Head Without Contrast    Result Date: 8/3/2020  Senescent changes without acute abnormality. Signer Name: Omar Chaves MD  Signed: 8/3/2020 11:35 PM  Workstation Name: [id number]  [facility]    Xr Chest 1 View    Result Date: 8/3/2020  No acute cardiopulmonary findings. Signer Name: Rianna Diaz MD  Signed: 8/3/2020 10:53 PM  Workstation Name: [id number]  [facility]               Assessment/Plan     Assessment/Plan:    This is a 80-year-old with history of A. fib on Xarelto, hypertension hyperlipidemia carotid stenosis status post right CEA in 2012 and temporal arteritis on chronic prednisone presented with an acute episode of headache dizziness while she was eating yesterday afternoon.   Initial patient blood pressure was greater than 200 systolic.    CT head and CTA did not show any acute pathology except for mild atherosclerosis in the left carotid bifurcation and origin of left subclavian.  MRI brain did not show any acute infarct.    Below history, diagnostics and imaging, the symptoms the patient described and has been drawn a picture which is classic of ocular migraine.  However, her blood pressure could be a trigger for this episode.    #1 ocular migraine  -Avoid triggers  -Recommend magnesium oxide 400 mg daily as a prophylaxis  -Outpatient neurology and ophthalmology (retinal abnormalities] follow-up.    #2 hypertensive emergency  -Gradually normalize her blood pressure  -She experiences episode of low blood pressure in the morning which makes her lightheaded and weak    #3 paroxysmal A. fib continue Xarelto  -Discontinue aspirin.        Stroke will sign off, will be available for any questions.          Abel Miguel MD  08/04/20  13:28

## 2020-08-04 NOTE — THERAPY DISCHARGE NOTE
Acute Care - Speech Language Pathology Initial Eval/Discharge  [facility]   Cognitive-Communication Evaluation         Patient Name: Avani Argueta  : 1940  MRN: [id number]  Today's Date: 2020               Admit Date: 8/3/2020     Visit Dx:    ICD-10-CM ICD-9-CM   1. Hypertensive urgency I16.0 401.9   2. Dizziness R42 780.4   3. Vision changes H53.9 368.9   4. Impaired mobility and ADLs Z74.09 V49.89    Z78.9      Patient Active Problem List   Diagnosis   • Paroxysmal atrial fibrillation (CMS/HCC)   • RBBB   • Carotid artery disease (CMS/HCC)   • Essential hypertension   • Dyslipidemia   • GERD (gastroesophageal reflux disease)   • Arthritis   • Hypertensive urgency   • Dizziness   • Temporal arteritis (CMS/HCC)     Past Medical History:   Diagnosis Date   • Acid reflux    • Arthritis    • Kidney disorder      Past Surgical History:   Procedure Laterality Date   • GALLBLADDER SURGERY     • HYSTERECTOMY            SLP EVALUATION (last 72 hours)      SLP SLC Evaluation     Row Name 20 1430                   Communication Assessment/Intervention    Document Type  evaluation;discharge evaluation/summary  -        Patient Observations  alert;cooperative;agree to therapy  -        Patient/Family Observations  No family present  -        Patient Effort  good  -           General Information    Patient Profile Reviewed  yes  -        Precautions/Limitations, Vision  WFL;for purposes of eval  -        Precautions/Limitations, Hearing  WFL;for purposes of eval  -        Prior Level of Function-Communication  [facility]  -        Plans/Goals Discussed with  patient;agreed upon  [facility]        Barriers to Rehab  none identified  -        Patient's Goals for Discharge  return to home  -           Pain Assessment    Additional Documentation  Pain Scale: FACES Pre/Post-Treatment (Group);Pain Scale: Numbers Pre/Post-Treatment (Group)  -           Pain Scale: Numbers Pre/Post-Treatment    Pain Scale:  Numbers, Pretreatment  0/10 - no pain  -CH        Pain Scale: Numbers, Post-Treatment  0/10 - no pain  -CH           Pain Scale: FACES Pre/Post-Treatment    Pain: FACES Scale, Pretreatment  0-->no hurt  -CH        Pain: FACES Scale, Post-Treatment  0-->no hurt  -CH           Comprehension Assessment/Intervention    Comprehension Assessment/Intervention  Auditory Comprehension;Reading Comprehension  -CH           Auditory Comprehension Assessment/Intervention    Auditory Comprehension (Communication)  WNL  -CH           Reading Comprehension Assessment/Intervention    Reading Comprehension (Communication)  WNL  -           Expression Assessment/Intervention    Expression Assessment/Intervention  verbal expression;graphic expression  -CH           Verbal Expression Assessment/Intervention    Verbal Expression  WNL  -        Automatic Speech (Communication)  response to greeting;WNL  -        Repetition  sentences;WNL  -CH        Phrase Completion  unpredictable;WNL  -CH        Responsive Naming  complex;WNL  -        Confrontational Naming  low frequency;WNL  -        Spontaneous/Functional Words  complex;WNL  -        Sentence Formulation  complex;WNL  -CH        Conversational Discourse/Fluency  WNL  -           Graphic Expression Assessment/Intervention    Graphic Expression  WNL  -        Functional Correspondence  WNL  -           Oral Motor Structure and Function    Oral Motor Structure and Function  WNL  -           Oral Musculature and Cranial Nerve Assessment    Oral Motor General Assessment  WFL  -           Motor Speech Assessment/Intervention    Motor Speech Function  WNL  -           Cursory Voice Assessment/Intervention    Quality and Resonance (Voice)  WNL  -CH           Cognitive Assessment Intervention- SLP    Cognitive Function (Cognition)  WNL  -        Orientation Status (Cognition)  awareness of basic personal information;person;place;time;situation;WNL  -CH        Memory  (Cognitive)  simple;mod-complex;functional;immediate;short-term;long-term;delayed;WNL  -        Attention (Cognitive)  selective;sustained;alternating;attention to detail;WNL  -        Thought Organization (Cognitive)  concrete divergent;abstract divergent;concrete convergent;abstract convergent;drawing conclusions;verbal sequencing;WNL  -        Reasoning (Cognitive)  deductive;mod-complex;WNL  -        Problem Solving (Cognitive)  simple;divergent;temporal;WNL  -        Functional Math (Cognitive)  simple;word problems;WNL  -        Executive Function (Cognition)  WNL  -        Pragmatics (Communication)  WN  -           SLP Clinical Impressions    SLP Diagnosis  No deficits identified with speech, language or cognition at this time  -        SLC Criteria for Skilled Therapy Interventions Met  no problems identified which require skilled intervention;baseline status  -        Functional Impact  no impact on function  -           Recommendations    Therapy Frequency (SLP SLC)  evaluation only  -        Anticipated Dischage Disposition (SLP)  home  -           SLP Discharge Summary    Discharge Destination  home  -        Discharge Diagnostic Statement  No deficits identified with speech, language or cognition at this time  -        Progress Toward Achieving Short/long Term Goals  discharge on same date as initial evaluation  -        Reason for Discharge  all goals and outcomes met, no further needs identified  -          User Key  (r) = Recorded By, (t) = Taken By, (c) = Cosigned By    Initials Name Effective Dates     Raisa Reno MS CCC-SLP 02/14/19 -            EDUCATION  The patient has been educated in the following areas:   Cognitive Impairment Communication Impairment.      SLP Recommendation and Plan  SLP Diagnosis: No deficits identified with speech, language or cognition at this time     SLC Criteria for Skilled Therapy Interventions Met: no problems identified  which require skilled intervention, baseline status  Anticipated Dischage Disposition (SLP): home                         Time Calculation:   Time Calculation- SLP     Row Name 08/04/20 1532             Time Calculation- SLP    SLP Start Time  1430  -      SLP Received On  08/04/20  -        User Key  (r) = Recorded By, (t) = Taken By, (c) = Cosigned By    Initials Name Provider Type    Raisa Richardson MS CCC-SLP Speech and Language Pathologist          Therapy Charges for Today     Code Description Service Date Service Provider Modifiers Qty    [id number] HC ST EVAL SPEECH AND PROD W LANG  3 8/4/2020 Raisa Reno MS CCC-SLP GN 1                   SLP Discharge Summary  Anticipated Dischage Disposition (SLP): home  Reason for Discharge: all goals and outcomes met, no further needs identified  Progress Toward Achieving Short/long Term Goals: discharge on same date as initial evaluation  Discharge Destination: home     Patient was not wearing a face mask and did not exhibit coughing during this therapy encounter.  Procedure performed was not aerosolizing, did not involve close contact (within 6 feet for at least 15 minutes or longer), and did not involve contact with infectious secretions or specimens.  Therapist used appropriate personal protective equipment including gloves, standard procedure mask and eye protection.  Appropriate PPE was worn during the entire therapy session.  Hand hygiene was completed before and after therapy session.       MS TASHA De Jesus  8/4/2020

## 2020-08-04 NOTE — H&P
"    [facility] Medicine Services  HISTORY AND PHYSICAL    Patient Name: Avani Argueta  : 1940  MRN: [id number]  Primary Care Physician: Kristofer Manuel MD  Date of admission: 8/3/2020      Subjective   Subjective     Chief Complaint:  Headache, elevated blood pressure      HPI:  Avani Argueta is a 80 y.o. female with a medical hx significant for PAF on Xarelto, HTN, history of temporal arteritis and current who presented to [facility] ED via EMS for complaints of a headache, vision changes, dizziness and elevated blood pressure.  Patient reports she was resting in a chair after dinner when suddenly her head \"felt weird.\"  Patient took her blood pressure at that time which read 170/80.  She reports associated nausea with several episodes of vomiting shortly after.  Patient states she could not focus her eyes and she felt they were rolling around in her head.  She denies syncope.  Patient takes losartan for blood pressure at 2100 and had not taken it yet.  Patient denies changes in speech, facial droop, weakness, numbness or confusion.  No changes in gait.  Her symptoms resolved shortly after arriving to the ER.  She denies fever, chills, cough, shortness of breath, body aches, loss of taste/smell.  No known exposure to COVID-19.  No personal history of CVA or TIA.  Family history significant for heart disease. Per record the patient's mother suffered a stroke. No use of tobacco, alcohol or drugs.  Patient lives at home with her .    While in the ED, patient's blood pressure was elevated (231/82). Labs revealed glucose 118, sodium 132, AST 38.  Negative troponin.  Urinalysis positive for trace leukocytes, no bacteria.  Chest x-ray clear.  ECG normal sinus rhythm with right bundle branch block and left anterior fascicular block.  CT head unremarkable.  CTA of head and neck shows advanced atherosclerotic changes of the left carotid bifurcation, evidence of prior surgery of the right neck " and perhaps right carotid artery, dominant left vertebral artery and atherosclerotic change of origin of the left subclavian artery.  The patient received 50 mg of losartan, 37.5-25 mg of Maxide and was started on a Cardene drip in the ED.  She will be admitted to the hospitalist service for further medical management.    Review of Systems   Constitutional: Negative for chills, diaphoresis, fatigue and fever.   HENT: Negative for congestion, sore throat and trouble swallowing.    Eyes: Positive for visual disturbance. Negative for pain.   Respiratory: Negative for cough, chest tightness, shortness of breath and wheezing.    Cardiovascular: Negative for chest pain, palpitations and leg swelling.   Gastrointestinal: Positive for nausea and vomiting. Negative for abdominal pain, blood in stool, constipation and diarrhea.   Genitourinary: Negative for difficulty urinating and dysuria.   Musculoskeletal: Negative for back pain and gait problem.   Skin: Negative for rash and wound.   Neurological: Positive for dizziness, light-headedness and headaches. Negative for tremors, seizures, syncope, facial asymmetry, speech difficulty, weakness and numbness.   Hematological: Negative for adenopathy. Does not bruise/bleed easily.   Psychiatric/Behavioral: Negative for agitation and confusion.      All other systems reviewed and are negative.     Personal History     Past Medical History:   Diagnosis Date   • Acid reflux    • Arthritis    • Kidney disorder        Past Surgical History:   Procedure Laterality Date   • GALLBLADDER SURGERY     • HYSTERECTOMY         Family History: family history includes Alcohol abuse in her father; Diabetes in her mother; Heart disease in her father and sister; Stroke in her mother. Otherwise pertinent FHx was reviewed and unremarkable.     Social History:  reports that she has never smoked. She has never used smokeless tobacco. She reports that she does not drink alcohol or use drugs.  Social  History     Social History Narrative   • Not on file       Medications:  Available home medication information reviewed.    (Not in a hospital admission)    Allergies   Allergen Reactions   • Penicillins Rash       Objective   Objective     Vital Signs:   Temp:  [98 °F (36.7 °C)] 98 °F (36.7 °C)  Heart Rate:  [84-96] 96  Resp:  [16] 16  BP: (136-231)/() 136/61   Total (NIH Stroke Scale): 0    Physical Exam   Constitutional: Awake, alert, lying in bed   Eyes: PERRLA, sclerae anicteric, no conjunctival injection  HENT: NCAT, mucous membranes moist, hard of hearing   Neck: Supple, no thyromegaly, no lymphadenopathy, trachea midline  Respiratory: Clear to auscultation bilaterally, nonlabored respirations   Cardiovascular: RRR, + systolic murmur, palpable pedal pulses bilaterally  Gastrointestinal: Positive bowel sounds, soft, nontender, no distention or guarding   Musculoskeletal: 1+ bilateral ankle edema, no clubbing or cyanosis to extremities  Psychiatric: Appropriate affect, cooperative  Neurologic: Oriented x 3, strength and sensation symmetric in all extremities, Cranial Nerves grossly intact to confrontation, speech clear, no facial droop, no pronator drift, normal finger-to-nose  Skin: No rashes or wounds appreciated     Results Reviewed:  I have personally reviewed current lab and radiology data.    Results from last 7 days   Lab Units 08/03/20  2128   WBC 10*3/mm3 8.87   HEMOGLOBIN g/dL 15.8   HEMATOCRIT % 45.8   PLATELETS 10*3/mm3 242     Results from last 7 days   Lab Units 08/03/20  2128   SODIUM mmol/L 132*   POTASSIUM mmol/L 3.7   CHLORIDE mmol/L 93*   CO2 mmol/L 28.0   BUN mg/dL 15   CREATININE mg/dL 0.86   GLUCOSE mg/dL 118*   CALCIUM mg/dL 10.1   ALT (SGPT) U/L 24   AST (SGOT) U/L 38*   TROPONIN T ng/mL <0.010     Estimated Creatinine Clearance: 51.7 mL/min (by C-G formula based on SCr of 0.86 mg/dL).  Brief Urine Lab Results  (Last result in the past 365 days)      Color   Clarity   Blood   Leuk  Est   Nitrite   Protein   CREAT   Urine HCG        08/03/20 2217 Yellow Turbid Negative Trace Negative Negative             Imaging Results (Last 24 Hours)     Procedure Component Value Units Date/Time    MRI Brain Without Contrast [[id number]] Resulted:  08/04/20 0254     Updated:  08/04/20 0307    CT Angiogram Neck [[id number]] Collected:  08/04/20 0003     Updated:  08/04/20 0003    Narrative:       CTA Neck, CTA Head     INDICATION:    Dizziness and lightheadedness. Vision changes. Carotid stenosis.    Head and neck CTA:    Neck - Patent cervical CCA/ICA/vertebral arteries. No apparent dissection. There is moderately advanced to advanced atherosclerotic change of the carotid bifurcation on the left. Evidence of prior surgery of the right neck and perhaps right carotid  artery. Dominant left vertebral artery. Atherosclerotic change of the origin of the left subclavian artery.    Head - No hemodynamically critical stenosis or vessel cut off. No apparent aneurysm. No dural sinus occlusion. No large completed infarct.    This is a preliminary wet read by Dr. Omar Chaves at 2358 hours. Final interpretation will be provided by neuroradiology. Please refer to final interpretation for detailed findings and any further recommendations.     CT Angiogram Head [[id number]] Collected:  08/04/20 0003     Updated:  08/04/20 0003    Narrative:       CTA Neck, CTA Head     INDICATION:    Dizziness and lightheadedness. Vision changes. Carotid stenosis.    Head and neck CTA:    Neck - Patent cervical CCA/ICA/vertebral arteries. No apparent dissection. There is moderately advanced to advanced atherosclerotic change of the carotid bifurcation on the left. Evidence of prior surgery of the right neck and perhaps right carotid  artery. Dominant left vertebral artery. Atherosclerotic change of the origin of the left subclavian artery.    Head - No hemodynamically critical stenosis or vessel cut off. No apparent aneurysm. No dural sinus  occlusion. No large completed infarct.    This is a preliminary wet read by Dr. Omar Chaves at 2358 hours. Final interpretation will be provided by neuroradiology. Please refer to final interpretation for detailed findings and any further recommendations.     CT Head Without Contrast [[id number]] Collected:  08/03/20 2335     Updated:  08/03/20 2337    Narrative:       CT Head WO    HISTORY:   80-year-old female with dizziness and lightheadedness.    TECHNIQUE:   Axial unenhanced head CT. Radiation dose reduction techniques included automated exposure control or exposure modulation based on body size. Count of known CT and cardiac nuc med studies performed in previous 12 months: 0.     Time of scan: 2317 hours    COMPARISON:   None.    FINDINGS:   No intracranial hemorrhage, mass, or infarct. No hydrocephalus or extra-axial fluid collection. There are senescent changes, including volume loss and nonspecific white matter change, but no acute abnormality is seen. The skull base, calvarium, and  extracranial soft tissues are normal.      Impression:       Senescent changes without acute abnormality.          Signer Name: Omar Chaves MD   Signed: 8/3/2020 11:35 PM   Workstation Name: [facility]    [facility]    XR Chest 1 View [[id number]] Collected:  08/03/20 2253     Updated:  08/03/20 2255    Narrative:       CR Chest 1 Vw    INDICATION:   80-year-old with history of hypertension. Weakness and dizziness with headache for one day     COMPARISON:    Portable chest 4/13/2016    FINDINGS:  Single portable AP view(s) of the chest.  Heart size is at the upper limits of normal. Stable tortuous atherosclerotic aorta. There are stable benign calcified granulomatous changes on the left. No acute pulmonary density or effusion      Impression:       No acute cardiopulmonary findings.    Signer Name: Rianna Diaz MD   Signed: 8/3/2020 10:53 PM   Workstation Name: [id number]    [facility] [place]         Assessment/Plan   Assessment & Plan     Active Hospital Problems    Diagnosis POA   • Hypertensive urgency [I16.0] Yes   • Dizziness [R42] Yes   • Temporal arteritis (CMS/HCC) [M31.6] Yes   • Paroxysmal atrial fibrillation (CMS/HCC) [I48.0] Yes   • Carotid artery disease (CMS/HCC) [I77.9] Yes     Ms. Argueta is a 80 y.o. female with a medical hx significant for PAF on Xarelto, HTN, history of temporal arteritis and current who presented to [facility] ED via EMS for complaints of a headache, vision changes, dizziness and elevated blood pressure.    Headache / Dizziness   Evaluation for a CVA/TIA  - CT head negative, CTA head / neck shows advanced atherosclerotic changes of the carotid bifurcation on the left and left subclavian artery  - obtain MRI of brain  - echo  - consult neurology  - CM/PT/OT/SLP  - keep npo   - check A1c, TSH, FLP  - administer 324 mg of ASA and start high-intensity statin   - neuro checks, fall precautions   - EKG with RBBB, left anterior fascicular block present on prior studies    Hypertensive urgency   - /93 on arrival   - received losartan and Maxzide in the ED   - started on Cardene drip, discontinue for now   - permissive hypertension with BP goal < 220/120     Paroxysmal atrial fibrillation   - CHADS-VASc 5  - restart Xarelto if MRI is negative   - continue flecainide,   - cardiac monitoring     Hx of Temporal arteritis   - continue prednisone   -Headache tonight, will get sed rate to rule out recurrence    DVT prophylaxis:  SCDs     CODE STATUS:    Code Status and Medical Interventions:   Ordered at: 08/04/20 0232     Level Of Support Discussed With:    Patient     Code Status:    CPR     Medical Interventions (Level of Support Prior to Arrest):    Full     Admission Status:  I believe this patient meets OBSERVATION status, however if further evaluation or treatment plans warrant, status may change.  Based upon current information, I predict  "patient's care encounter to be less than or equal to 2 midnights.    Electronically signed by Bess Morgan PA-C, 08/04/20, 2:06 AM.        Attending   Admission Attestation       I have seen and examined the patient, performing an independent face-to-face diagnostic evaluation with plan of care reviewed and developed with the advanced practice clinician (APC).      Brief Summary Statement:   Avani Argueta is a 80 y.o. female  with a PMH significant for atrial fibrillation on Xarelto, history of temporal arteritis, HTN who presents to the ED with complaints of dizzy sensation.  Patient states that after dinner tonight she suddenly felt \"weird \".  She states that she felt off, with a dizzy sensation.  She reports associated nausea and vomiting.  Her blood pressure at home was 170 systolic.  She had difficulty focusing her eyes saying they were \"rolling\".  And presented to the ED for further evaluation.  After arrival in the ED, patient began to experience a headache.    Remainder of detailed HPI is as noted by APC and has been reviewed and/or edited by me for completeness.    Attending Physical Exam:  Constitutional: Awake, alert  Eyes: PERRLA, sclerae anicteric, no conjunctival injection  HENT: NCAT, mucous membranes moist  Neck: Supple, no thyromegaly, no lymphadenopathy, trachea midline  Respiratory: Clear to auscultation bilaterally, nonlabored respirations   Cardiovascular: RRR, no murmurs, rubs, or gallops, palpable pedal pulses bilaterally  Gastrointestinal: Positive bowel sounds, soft, nontender, nondistended  Musculoskeletal: No bilateral ankle edema, no clubbing or cyanosis to extremities  Psychiatric: Appropriate affect, cooperative  Neurologic: Oriented x 3, strength symmetric in all extremities, Cranial Nerves grossly intact to confrontation, speech clear  Skin: No rashes      Brief Assessment/Plan :  See detailed assessment and plan developed with APC which I have reviewed and/or edited for " completeness.    Electronically signed by Fern Taylor DO, 08/04/20, 3:15 AM.

## 2020-08-04 NOTE — CONSULTS
"Stroke Consult Note    Patient Name: Avani Argueta   MRN: [id number]  Age: 80 y.o.  Sex: female  : 1940    Primary Care Physician: Kristofer Manuel MD  Referring Physician: Dr. Taylor    Handedness: Right  Race:     Chief Complaint/Reason for Consultation: Dizziness, headache, visual changes    Subjective .  HPI: Avani Argueta is an 80-year-old, , right-handed female with known diagnosis of paroxysmal A. fib (Xarelto), HTN, HLD, Brite's disease, carotid stenosis (s/p right CEA, follows with Dr. Olivera), and temporal arteritis who presents following an acute episode of headache, dizziness, and visual changes associated with elevated blood pressure.  Patient reports that around  she suddenly became very dizzy with left frontal headache.  She states that she saw \"jagged [place],\" from her left eye, that was notably worse when she turned her head to the left. She took her blood pressure and states it was 175/80, she had not yet taken her evening dose of losartan.   Additionally patient had nausea and vomiting.  Denies syncope, denies LOC .  No alteration in gait, patient states she was able to ambulate to the bathroom.  Patient denies weakness, denies numbness, denies confusion, and denies speech changes.    Upon arrival to ED patient symptoms had resolved. Initial BP in /82.   Patient was given home doses of losartan 50 mg and triamterene-hydrochlorothiazide 37.5/25 while in the ED and started on a Cardene drip.  Patient states that she has occasionally felt dizzy upon awakening in the mornings over the past few weeks, and feels it is related to hypotension (systolic BP 110s).  Patient reports compliance with medications.  Denies smoking, denies EtOH, denies drug use.    Last Known Normal Date/Time: 8/3/2020 2000 EST     Review of Systems   Constitutional: Negative.  Negative for chills and fever.   Eyes: Positive for visual disturbance.   Respiratory: Negative.    Cardiovascular: " Negative.    Gastrointestinal: Positive for nausea and vomiting.   Genitourinary: Negative.    Musculoskeletal: Positive for back pain.   Skin: Negative.    Neurological: Positive for dizziness and headaches. Negative for facial asymmetry, speech difficulty, weakness and numbness.   Psychiatric/Behavioral: Negative.       Past Medical History:   Diagnosis Date   • Acid reflux    • Arthritis    • Kidney disorder      Past Surgical History:   Procedure Laterality Date   • GALLBLADDER SURGERY     • HYSTERECTOMY       Family History   Problem Relation Age of Onset   • Stroke Mother    • Diabetes Mother    • Heart disease Father    • Alcohol abuse Father    • Heart disease Sister      Social History     Socioeconomic History   • Marital status:      Spouse name: Not on file   • Number of children: Not on file   • Years of education: Not on file   • Highest education level: Not on file   Tobacco Use   • Smoking status: Never Smoker   • Smokeless tobacco: Never Used   Substance and Sexual Activity   • Alcohol use: No     Frequency: Never   • Drug use: No   • Sexual activity: Defer     Allergies   Allergen Reactions   • Penicillins Rash     Prior to Admission medications    Medication Sig Start Date End Date Taking? Authorizing Provider   ASTAXANTHIN PO Take  by mouth Daily.    Boston Castellano MD   flecainide (TAMBOCOR) 50 MG tablet TAKE ONE TABLET BY MOUTH EVERY 12 HOURS 7/21/20   Owen Olivera MD   folic acid (FOLVITE) 1 MG tablet Take 1 mg by mouth 2 (Two) Times a Day.    Boston Castellano MD   KRILL OIL PO Take 2 capsules by mouth Daily.    Boston Castellano MD   LOSARTAN POTASSIUM PO Take 50 mg by mouth Daily.    Boston Castellano MD   Multiple Vitamins-Iron (CHLORELLA PO) Take 5 tablets by mouth Daily.    Boston Castellano MD   Multiple Vitamins-Minerals (MULTIVITAMIN ADULT PO) Take  by mouth Daily.    Boston Castellano MD   predniSONE (DELTASONE) 5 MG tablet Take 5 mg by mouth  Daily.    Boston Castellano MD   Probiotic Product (PROBIOTIC PO) Take 1 tablet by mouth Daily.    Boston Castellano MD   triamterene-hydrochlorothiazide (MAXZIDE-25) 37.5-25 MG per tablet Take 1 tablet by mouth Daily.    Boston Castellano MD   Turmeric, Curcuma [place]a, (CURCUMIN) powder 1 tablet Daily. 1 tablet daily    Boston Castellano MD   UBIQUINOL PO Take  by mouth Daily.    Boston Castellano MD   Unable to find 1 each Daily. Med Name: complete spare restore    Boston Castellano MD   vitamin B-12 (CYANOCOBALAMIN) 1000 MCG tablet Take 5,000 mcg by mouth Daily.    Boston Castellano MD   vitamin B-6 (PYRIDOXINE) 50 MG tablet Take 100 mg by mouth Daily.    Boston Castellano MD   XARELTO 20 MG tablet TAKE ONE TABLET BY MOUTH DAILY 7/21/20   Owen Olivera MD             Objective     Temp:  [98 °F (36.7 °C)] 98 °F (36.7 °C)  Heart Rate:  [84-96] 87  Resp:  [16] 16  BP: (136-231)/() 162/83  Neurological Exam  Mental Status  Awake, alert and oriented to person, place and time.Alert. Speech is normal. Language is fluent with no aphasia. Attention and concentration are normal. Fund of knowledge is appropriate for level of education.    Cranial Nerves  CN II: Visual fields full to confrontation.  CN III, IV, VI: Extraocular movements intact bilaterally. Extraocular movements intact bilaterally. Normal lids and orbits bilaterally. Pupils equal round and reactive to light bilaterally.  CN V: Facial sensation is normal.  CN VII: Full and symmetric facial movement.  CN VIII: Hearing is normal.  CN IX, X: Palate elevates symmetrically  CN XI: Shoulder shrug strength is normal.  CN XII: Tongue midline without atrophy or fasciculations.    Motor  Normal muscle bulk throughout. No fasciculations present. Normal muscle tone. No abnormal involuntary movements. Strength is 5/5 in all four extremities except as noted.  No drift in any extremity.  Right lower extremity strength 4+/5 (patient  states this is chronic).  All other extremity strength 5/5.    Sensory  Sensation is intact to light touch, pinprick, vibration and proprioception in all four extremities.    Coordination  Finger-to-nose, rapid alternating movements and heel-to-shin normal bilaterally without dysmetria.    Gait  Not tested.      Physical Exam   Constitutional: She is oriented to person, place, and time. She appears well-developed and well-nourished.   HENT:   Head: Normocephalic and atraumatic.   Eyes: Pupils are equal, round, and reactive to light. EOM and lids are normal.   Neck: Normal range of motion. Neck supple.   Cardiovascular: Normal rate and intact distal pulses.   Pulmonary/Chest: Effort normal. No respiratory distress.   Abdominal: Soft. There is no tenderness.   Musculoskeletal: Normal range of motion. She exhibits no edema.   Neurological: She is alert and oriented to person, place, and time. No cranial nerve deficit or sensory deficit. She exhibits normal muscle tone. Coordination normal. Coordination normal.   Skin: Skin is warm and dry.   Psychiatric: She has a normal mood and affect. Her speech is normal and behavior is normal.   Vitals reviewed.      Acute Stroke Data    Alteplase (tPA) Inclusion / Exclusion Criteria    Time: 05:00  Person Administering Scale: JAEL Ramos    Inclusion Criteria  [x]   18 years of age or greater   []   Onset of symptoms < 4.5 hours before beginning treatment (stroke onset = time patient was last seen well or without symptoms).   []   Diagnosis of acute ischemic stroke causing measurable disabling deficit (Complete Hemianopia, Any Aphasia, Visual or Sensory Extinction, Any weakness limiting sustained effort against gravity)   []   Any remaining deficit considered potentially disabling in view of patient and practitioner   Exclusion criteria (Do not proceed with Alteplase if any are checked under exclusion criteria)  []   Onset unknown or GREATER than 4.5 hours   []   ICH on  CT/MRI   []   CT demonstrates hypodensity representing acute or subacute infarct   []   Significant head trauma or prior stroke in the previous 3 months   []   Symptoms suggestive of subarachnoid hemorrhage   []   History of un-ruptured intracranial aneurysm GREATER than 10 mm   []   Recent intracranial or intraspinal surgery within the last 3 months   []   Arterial puncture at a non-compressible site in the previous 7 days   []   Active internal bleeding   []   Acute bleeding tendency   []   Platelet count LESS than 100,000 for known hematological diseases such as leukemia, thrombocytopenia or chronic cirrhosis   []   Current use of anticoagulant with INR GREATER than 1.7 or PT GREATER than 15 seconds, aPTT GREATER than 40 seconds   []   Heparin received within 48 hours, resulting in abnormally elevated aPTT GREATER than upper limit of normal   []   Current use of direct thrombin inhibitors or direct factor Xa inhibitors in the past 48 hours   []   Elevated blood pressure refractory to treatment (systolic GREATER than 185 mm/Hg or diastolic  GREATER than 110 mm/Hg   []   Suspected infective endocarditis and aortic arch dissection   []   Current use of therapeutic treatment dose of low-molecular-weight heparin (LMWH) within the previous 24 hours   []   Structural GI malignancy or bleed   Relative exclusion for all patients  []   Only minor non-disabling symptoms   []   Pregnancy   []   Seizure at onset with postictal residual neurological impairments   []   Major surgery or previous trauma within past 14 days   []   History of previous spontaneous ICH, intracranial neoplasm, or AV malformation   []   Postpartum (within previous 14 days)   []   Recent GI or urinary tract hemorrhage (within previous 21 days)   []   Recent acute MI (within previous 3 months)   []   History of un-ruptured intracranial aneurysm LESS than 10 mm   []   History of ruptured intracranial aneurysm   []   Blood glucose LESS than 50 mg/dL (2.7  mmol/L)   []   Dural puncture within the last 7 days   []   Known GREATER than 10 cerebral microbleeds   Additional exclusions for patients with symptoms onset between 3 and 4.5 hours.  [x]   Age > 80.   [x]   On any anticoagulants regardless of INR  >>> Warfarin (Coumadin), Heparin, Enoxaparin (Lovenox), fondaparinux (Arixtra), bivalirudin (Angiomax), Argatroban, dabigatran (Pradaxa), rivaroxaban (Xarelto), or apixaban (Eliquis)   []   Severe stroke (NIHSS > 25).   []   History of BOTH diabetes and previous ischemic stroke.   []   The risks and benefits have been discussed with the patient or family related to the administration of IV Alteplase for stroke symptoms.   []   I have discussed and reviewed the patient's case and imaging with the attending prior to IV Alteplase.    Time Alteplase administered     Complete resolution of symptoms, patient returned to baseline    Hospital Meds:  Scheduled-   atorvastatin 80 mg Oral Nightly   flecainide 50 mg Oral Q12H   folic acid 1 mg Oral BID   predniSONE 5 mg Oral Daily With Breakfast   rivaroxaban 20 mg Oral Daily With Breakfast   sodium chloride 10 mL Intravenous Q12H     Infusions-     PRNs- ondansetron **OR** ondansetron  •  sodium chloride  •  sodium chloride    Functional Status Prior to Current Stroke/[place] Score: MRS 0    NIH Stroke Scale  Time: 05:00  Person Administering Scale: JAEL Ramos    1a  Level of consciousness: 0=alert; keenly responsive   1b. LOC questions:  0= answers both questions correctly   1c. LOC commands: 0=Performs both tasks correctly   2.  Best Gaze: 0=normal   3.  Visual: 0=No visual loss   4. Facial Palsy: 0=Normal symmetric movement   5a.  Motor left arm: 0=No drift, limb holds 90 (or 45) degrees for full 10 seconds   5b.  Motor right arm: 0=No drift, limb holds 90 (or 45) degrees for full 10 seconds   6a. motor left le=No drift, limb holds 90 (or 45) degrees for full 10 seconds   6b  Motor right le=No drift, limb  holds 90 (or 45) degrees for full 10 seconds   7. Limb Ataxia: 0=Absent   8.  Sensory: 0=Normal; no sensory loss   9. Best Language:  0=No aphasia, normal   10. Dysarthria: 0=Normal   11. Extinction and Inattention: 0=No abnormality    Total:   0       Results Reviewed:  I have personally reviewed current lab, radiology, and data and agree with results.  WBC   Date Value Ref Range Status   08/03/2020 8.87 3.40 - 10.80 10*3/mm3 Final     RBC   Date Value Ref Range Status   08/03/2020 5.02 3.77 - 5.28 10*6/mm3 Final     Hemoglobin   Date Value Ref Range Status   08/03/2020 15.8 12.0 - 15.9 g/dL Final     Hematocrit   Date Value Ref Range Status   08/03/2020 45.8 34.0 - 46.6 % Final     MCV   Date Value Ref Range Status   08/03/2020 91.2 79.0 - 97.0 fL Final     MCH   Date Value Ref Range Status   08/03/2020 31.5 26.6 - 33.0 pg Final     MCHC   Date Value Ref Range Status   08/03/2020 34.5 31.5 - 35.7 g/dL Final     RDW   Date Value Ref Range Status   08/03/2020 13.4 12.3 - 15.4 % Final     RDW-SD   Date Value Ref Range Status   08/03/2020 45.3 37.0 - 54.0 fl Final     MPV   Date Value Ref Range Status   08/03/2020 10.3 6.0 - 12.0 fL Final     Platelets   Date Value Ref Range Status   08/03/2020 242 140 - 450 10*3/mm3 Final     Neutrophil %   Date Value Ref Range Status   08/03/2020 55.6 42.7 - 76.0 % Final     Lymphocyte %   Date Value Ref Range Status   08/03/2020 32.6 19.6 - 45.3 % Final     Monocyte %   Date Value Ref Range Status   08/03/2020 10.0 5.0 - 12.0 % Final     Eosinophil %   Date Value Ref Range Status   08/03/2020 0.8 0.3 - 6.2 % Final     Basophil %   Date Value Ref Range Status   08/03/2020 0.7 0.0 - 1.5 % Final     Immature Grans %   Date Value Ref Range Status   08/03/2020 0.3 0.0 - 0.5 % Final     Neutrophils, Absolute   Date Value Ref Range Status   08/03/2020 4.93 1.70 - 7.00 10*3/mm3 Final     Lymphocytes, Absolute   Date Value Ref Range Status   08/03/2020 2.89 0.70 - 3.10 10*3/mm3 Final      Monocytes, Absolute   Date Value Ref Range Status   08/03/2020 0.89 0.10 - 0.90 10*3/mm3 Final     Eosinophils, Absolute   Date Value Ref Range Status   08/03/2020 0.07 0.00 - 0.40 10*3/mm3 Final     Basophils, Absolute   Date Value Ref Range Status   08/03/2020 0.06 0.00 - 0.20 10*3/mm3 Final     Immature Grans, Absolute   Date Value Ref Range Status   08/03/2020 0.03 0.00 - 0.05 10*3/mm3 Final     nRBC   Date Value Ref Range Status   08/03/2020 0.0 0.0 - 0.2 /100 WBC Final     Basic Metabolic Panel    Sodium Sodium   Date Value Ref Range Status   08/03/2020 132 (L) 136 - 145 mmol/L Final      Potassium Potassium   Date Value Ref Range Status   08/03/2020 3.7 3.5 - 5.2 mmol/L Final     Comment:     Specimen hemolyzed.  Results may be affected.      Chloride Chloride   Date Value Ref Range Status   08/03/2020 93 (L) 98 - 107 mmol/L Final      Bicarbonate No results found for: PLASMABICARB   BUN BUN   Date Value Ref Range Status   08/03/2020 15 8 - 23 mg/dL Final      Creatinine Creatinine   Date Value Ref Range Status   08/03/2020 0.86 0.57 - 1.00 mg/dL Final      Calcium Calcium   Date Value Ref Range Status   08/03/2020 10.1 8.6 - 10.5 mg/dL Final      Glucose      No components found for: GLUCOSE.*     Ct Head Without Contrast    Result Date: 8/3/2020  Senescent changes without acute abnormality. Signer Name: Omar Chaves MD  Signed: 8/3/2020 11:35 PM  Workstation Name: [id number]  [facility]    Xr Chest 1 View    Result Date: 8/3/2020  No acute cardiopulmonary findings. Signer Name: Rianna Diaz MD  Signed: 8/3/2020 10:53 PM  Workstation Name: [id number]  [facility]           Assessment/Plan:   80-year-old, , right-handed female with known diagnosis of paroxysmal A. fib (Xarelto), HTN, HLD, Brite's disease, carotid stenosis (s/p right CEA 2012, follows with Dr. Olivera), and temporal arteritis (chronic prednisone) who presents following an  acute episode of headache, dizziness, and visual changes associated with elevated blood pressure.  Upon arrival to ED symptoms had resolved, blood pressure in /82.  Hypertension was treated in ED with home BP meds and Cardene.  CT head negative for acute changes.  CTA H/N showed moderate to severe atherosclerosis of left carotid at the bifurcation as well as arthrosclerosis of the left subclavian. Prior R CEA.  No intracranial hemodynamically significant flow-limiting stenosis.  MRI was obtained and negative.        1. Hypertensive urgency v TIA    -TIA/ischemic stroke order set without thrombolytic therapy   -Goal -180 for now, BP now 162/83   -Full dose aspirin given in ED   -Carotid duplex (left ICA stenosis 50-69% on 6/1/2020)   - A1c with a.m. labs   -Cardiac diet, patient has passed bedside dysphagia screen    2.  Paroxysmal A. Fib   -Continue Xarelto 20 mg daily   -Continue flecainide     3.  Hyperlipidemia   -Lipid panel with a.m. labs   -Atorvastatin 80 mg daily          Isabelle Bhatt, JAEL  August 4, 2020  5:31 AM

## 2020-08-04 NOTE — PLAN OF CARE
Problem: Patient Care Overview  Goal: Plan of Care Review  Outcome: Ongoing (interventions implemented as appropriate)  Flowsheets (Taken 8/4/2020 0937)  Plan of Care Reviewed With: patient (Pended)  Outcome Summary: PT evaluation completed. Pt demonstrated appropriate functional mobility, but was limited due to hypertensive BP. She ambulated 250 ft with SBA, with no experience of loss of balance or knee buckling. Pt's BP was elevated following her walk (194/62 mmHg), but she experiended no signs or symptoms. Pt will be followed by PT services to assess BP tolerance of activties for safe discharge. Discharge recommendation is home with assist. (Pended)

## 2020-08-04 NOTE — PROGRESS NOTES
I assumed care for Ms. Argueta this morning and met with the patient and stroke team this morning.  She is feeling much better.    Dr. Miguel believes she suffered ocular migraine based on the description of the patient's ocular disturbance as well as hypertensive emergency.  He has recommended mag ox 400 mg daily as prophylaxis for that.  She is cleared to continue her Xarelto and discontinue ASA as there is no evidence of stroke on MRI.      Hypertensive urgency   - /93 on arrival, cardene has since been discontinued  -The patient takes her losartan at night and notes she is hypotensive in the AM's with lightheadedness and hypertensive in the evenings.  I have split her dose into 25 mg losartan BID and we will see how she does on that  --ECHO pending     Paroxysmal atrial fibrillation   - CHADS-VASc 5  - restarted Xarelto   - continue flecainide,   - cardiac monitoring     Electronically signed by Xochilt Villavicencio MD, 08/04/20, 3:32 PM.

## 2020-08-04 NOTE — PLAN OF CARE
Problem: Patient Care Overview  Goal: Plan of Care Review  Flowsheets (Taken 8/4/2020 0950)  Outcome Summary: OT evaluation completed. Pt. states she is at baseline with ADLs. Demonstrated bed mobility with conditional independence and t/fs with SUP for safety. Pt. demonstrated good safety awareness and sequencing. Independent with grooming and LBD. No further OT services warranted at this time. D/C OT. Recommend home with assist at discharge.

## 2020-08-04 NOTE — THERAPY DISCHARGE NOTE
Acute Care - Occupational Therapy Initial Eval/Discharge   [place]     Patient Name: Avani Argueta  : 1940  MRN: [id number]  Today's Date: 2020               Admit Date: 8/3/2020       ICD-10-CM ICD-9-CM   1. Hypertensive urgency I16.0 401.9   2. Dizziness R42 780.4   3. Vision changes H53.9 368.9   4. Impaired mobility and ADLs Z74.09 V49.89    Z78.9      Patient Active Problem List   Diagnosis   • Paroxysmal atrial fibrillation (CMS/HCC)   • RBBB   • Carotid artery disease (CMS/HCC)   • Essential hypertension   • Dyslipidemia   • GERD (gastroesophageal reflux disease)   • Arthritis   • Hypertensive urgency   • Dizziness   • Temporal arteritis (CMS/HCC)     Past Medical History:   Diagnosis Date   • Acid reflux    • Arthritis    • Kidney disorder      Past Surgical History:   Procedure Laterality Date   • GALLBLADDER SURGERY     • HYSTERECTOMY            OT ASSESSMENT FLOWSHEET (last 12 hours)      Occupational Therapy Evaluation     Row Name 20 1049                   OT Evaluation Time/Intention    Document Type  evaluation;discharge evaluation/summary  -        Mode of Treatment  occupational therapy  -        Patient Effort  good  -        Comment  Pt. states she is at baseline with ADLs.  -           General Information    Patient Profile Reviewed?  yes  -        Patient Observations  alert;cooperative;agree to therapy  -        Patient/Family Observations   present for treatment  -        General Observations of Patient  Pt. in bed on arrival   -        Prior Level of Function  independent:;all household mobility;transfer;ADL's  -        Equipment Currently Used at Home  none  -        Existing Precautions/Restrictions  fall  -        Barriers to Rehab  none identified  -           Cognitive Assessment/Intervention- PT/OT    Orientation Status (Cognition)  oriented x 4  -LC        Follows Commands (Cognition)  WNL  -           Safety Issues, Functional  Mobility    Safety Issues Affecting Function (Mobility)  -- none   -        Impairments Affecting Function (Mobility)  endurance/activity tolerance;strength  -           Bed Mobility Assessment/Treatment    Bed Mobility Assessment/Treatment  bed mobility (all) activities  -        [place] Level (Bed Mobility)  conditional independence  -        Assistive Device (Bed Mobility)  head of bed elevated;bed rails  -        Comment (Bed Mobility)  Pt. demonstrated good sequencing and safety   -           Functional Mobility    Functional Mobility- Ind. Level  supervision required  -        Functional Mobility- Device  other (see comments) Gait Belt   -        Functional Mobility-Distance (Feet)  20  -        Functional Mobility- Comment  Demonstrated good safety and sequencing   -           Transfer Assessment/Treatment    Transfer Assessment/Treatment  sit-stand transfer;stand-sit transfer  -        Comment (Transfers)  Demonstrated good safety and sequencing   -           Sit-Stand Transfer    Sit-Stand [place] (Transfers)  supervision  -        Assistive Device (Sit-Stand Transfers)  other (see comments) Gait Belt   -           Stand-Sit Transfer    Stand-Sit [place] (Transfers)  supervision  -        Assistive Device (Stand-Sit Transfers)  other (see comments) Gait Belt   -           ADL Assessment/Intervention    50389 - OT Self Care/Mgmt Minutes  11  -        BADL Assessment/Intervention  lower body dressing;grooming  -           Lower Body Dressing Assessment/Training    Lower Body Dressing [place] Level  don;doff;socks;independent  -        Lower Body Dressing Position  unsupported sitting  -           Grooming Assessment/Training    [place] Level (Grooming)  hair care, combing/brushing;oral care regimen;wash face, hands;independent  -        Grooming Position  unsupported standing  -        Comment (Grooming)  Pt. stood at sink and completed  grooming with good safety. No LOB. SUP for safety only.   -           BADL Safety/Performance    Impairments, BADL Safety/Performance  endurance/activity tolerance;strength;range of motion  -           General ROM    GENERAL ROM COMMENTS  Shoulder flex limited due to OA, Distally AROM WFL   -           MMT (Manual Muscle Testing)    General MMT Comments  Deferred proximal MMT, Distally Grossly WFL   -           Motor Assessment/Interventions    Additional Documentation  Balance (Group)  -           Balance    Balance  static sitting balance;static standing balance;dynamic sitting balance;dynamic standing balance  -           Static Sitting Balance    Level of [place] (Unsupported Sitting, Static Balance)  independent  -        Sitting Position (Unsupported Sitting, Static Balance)  sitting on edge of bed  -        Time Able to Maintain Position (Unsupported Sitting, Static Balance)  1 to 2 minutes  -           Dynamic Sitting Balance    Level of [place], Reaches Outside Midline (Sitting, Dynamic Balance)  independent  -        Sitting Position, Reaches Outside Midline (Sitting, Dynamic Balance)  sitting on edge of bed  -           Static Standing Balance    Level of [place] (Supported Standing, Static Balance)  independent  -        Time Able to Maintain Position (Supported Standing, Static Balance)  2 to 3 minutes  -           Dynamic Standing Balance    Level of [place], Reaches Outside Midline (Standing, Dynamic Balance)  independent  -        Time Able to Maintain Position, Reaches Outside Midline (Standing, Dynamic Balance)  3 to 4 minutes  -        Comment, Reaches Outside Midline (Standing, Dynamic Balance)  Completing grooming tasks at sink  -           Positioning and Restraints    Pre-Treatment Position  in bed  -        Post Treatment Position  chair  -        In Chair  notified nsg;reclined;call light within reach;encouraged to call for assist;exit  alarm on;waffle cushion;legs elevated  -           Pain Assessment    Additional Documentation  Pain Scale: Numbers Pre/Post-Treatment (Group)  -LC           Pain Scale: Numbers Pre/Post-Treatment    Pain Scale: Numbers, Pretreatment  0/10 - no pain  -LC        Pain Scale: Numbers, Post-Treatment  0/10 - no pain  -LC        Pain Intervention(s)  Repositioned;Ambulation/increased activity  -LC           Plan of Care Review    Plan of Care Reviewed With  patient  -LC           Clinical Impression (OT)    Therapy Frequency (OT Eval)  evaluation only  -LC        Care Plan Review (OT)  evaluation/treatment results reviewed;care plan/treatment goals reviewed;risks/benefits reviewed;current/potential barriers reviewed;patient/other agree to care plan  -        Anticipated Discharge Disposition (OT)  home with assist  -           Vital Signs    Pre Systolic BP Rehab  194  -LC        Pre Treatment Diastolic BP  62  -LC        Post Systolic BP Rehab  190  -LC        Post Treatment Diastolic BP  66  -LC           OT Goals    Transfer Goal Selection (OT)  transfer, OT goal 1  -LC        Dressing Goal Selection (OT)  dressing, OT goal 1  -LC           Transfer Goal 1 (OT)    Activity/Assistive Device (Transfer Goal 1, OT)  sit-to-stand/stand-to-sit  -LC        [place] Level/Cues Needed (Transfer Goal 1, OT)  supervision required  -LC        Time Frame (Transfer Goal 1, OT)  1 day;long term goal (LTG)  -LC        Progress/Outcome (Transfer Goal 1, OT)  goal met  -LC           Dressing Goal 1 (OT)    Activity/Assistive Device (Dressing Goal 1, OT)  lower body dressing  -LC        [place]/Cues Needed (Dressing Goal 1, OT)  independent  -LC        Time Frame (Dressing Goal 1, OT)  1 day;long term goal (LTG)  -LC        Progress/Outcome (Dressing Goal 1, OT)  goal met  -LC           Discharge Summary (Occupational Therapy)    Additional Documentation  Discharge Summary, OT Eval (Group)  -LC           Discharge  Summary, OT Eval    Reason for Discharge (OT Discharge Summary)  no further needs identified  -        Outcomes Achieved Upon Discharge (OT Discharge Summary)  able to achieve all goals within established timeline  -          User Key  (r) = Recorded By, (t) = Taken By, (c) = Cosigned By    Initials Name Effective Dates     Sorin JACQUE Smith 07/18/19 -           Occupational Therapy Education                 Title: PT OT SLP Therapies (In Progress)     Topic: Occupational Therapy (In Progress)     Point: ADL training (Done)     Description:   Instruct learner(s) on proper safety adaptation and remediation techniques during self care or transfers.   Instruct in proper use of assistive devices.              Learning Progress Summary           Patient Acceptance, E,D, VU,DU by  at 8/4/2020 1049   Significant Other Acceptance, E,D, VU,DU by  at 8/4/2020 1049                   Point: Home exercise program (Not Started)     Description:   Instruct learner(s) on appropriate technique for monitoring, assisting and/or progressing therapeutic exercises/activities.              Learner Progress:   Not documented in this visit.          Point: Precautions (Done)     Description:   Instruct learner(s) on prescribed precautions during self-care and functional transfers.              Learning Progress Summary           Patient Acceptance, E,D, VU,DU by  at 8/4/2020 1049   Significant Other Acceptance, E,D, VU,DU by  at 8/4/2020 1049                   Point: Body mechanics (Done)     Description:   Instruct learner(s) on proper positioning and spine alignment during self-care, functional mobility activities and/or exercises.              Learning Progress Summary           Patient Acceptance, E,D, VU,DU by  at 8/4/2020 1049   Significant Other Acceptance, E,D, VU,DU by  at 8/4/2020 1049                               User Key     Initials Effective Dates Name Provider Type Discipline     07/18/19 -  Sorin  Sarah, OT Occupational Therapist OT                OT Recommendation and Plan  Outcome Summary/Treatment Plan (OT)  Anticipated Discharge Disposition (OT): home with assist  Reason for Discharge (OT Discharge Summary): no further needs identified  Therapy Frequency (OT Eval): evaluation only  Plan of Care Review  Plan of Care Reviewed With: patient  Plan of Care Reviewed With: patient  Outcome Summary: OT evaluation completed. Pt. states she is at baseline with ADLs. Demonstrated bed mobility with conditional independence and t/fs with SUP for safety. Pt. demonstrated good safety awareness and sequencing. Independent with grooming and LBD. No further OT services warranted at this time. D/C OT. Recommend home with assist at discharge. .     Rehab Goal Summary     Row Name 08/04/20 1049 08/04/20 0944          Bed Mobility Goal 1 (PT)    Activity/Assistive Device (Bed Mobility Goal 1, PT)  --  rolling to left;rolling to right;sit to supine/supine to sit  -KM (r) JG (t) KM (c)     [place] Level/Cues Needed (Bed Mobility Goal 1, PT)  --  independent  -KM (r) JG (t) KM (c)     Time Frame (Bed Mobility Goal 1, PT)  --  long term goal (LTG);10 days  -KM (r) JG (t) KM (c)        Transfer Goal 1 (PT)    Activity/Assistive Device (Transfer Goal 1, PT)  --  sit-to-stand/stand-to-sit;bed-to-chair/chair-to-bed  -KM (r) JG (t) KM (c)     [place] Level/Cues Needed (Transfer Goal 1, PT)  --  conditional independence  -KM (r) JG (t) KM (c)     Time Frame (Transfer Goal 1, PT)  --  long term goal (LTG);10 days  -KM (r) JG (t) KM (c)        Gait Training Goal 1 (PT)    Activity/Assistive Device (Gait Training Goal 1, PT)  --  gait (walking locomotion)  -KM (r) JG (t) KM (c)     [place] Level (Gait Training Goal 1, PT)  --  standby assist  -KM (r) JG (t) KM (c)     Distance (Gait Goal 1, PT)  --  300 ft  -KM (r) JG (t) KM (c)     Time Frame (Gait Training Goal 1, PT)  --  long term goal (LTG);10 days  -KM (r) JG (t) KM  (c)        Patient Education Goal (PT)    Activity (Patient Education Goal, PT)  --  Pt educated about HEP  -KM (r) JG (t) KM (c)     [place]/Cues/Accuracy (Memory Goal 2, PT)  --  demonstrates adequately;independent;verbalizes understanding  -KM (r) JG (t) KM (c)     Time Frame (Patient Education Goal, PT)  --  long term goal (LTG);10 days  -KM (r) JG (t) KM (c)        Occupational Therapy Goals    Transfer Goal Selection (OT)  transfer, OT goal 1  -LC  --     Dressing Goal Selection (OT)  dressing, OT goal 1  -LC  --        Transfer Goal 1 (OT)    Activity/Assistive Device (Transfer Goal 1, OT)  sit-to-stand/stand-to-sit  -LC  --     [place] Level/Cues Needed (Transfer Goal 1, OT)  supervision required  -LC  --     Time Frame (Transfer Goal 1, OT)  1 day;long term goal (LTG)  -LC  --     Progress/Outcome (Transfer Goal 1, OT)  goal met  -LC  --        Dressing Goal 1 (OT)    Activity/Assistive Device (Dressing Goal 1, OT)  lower body dressing  -LC  --     [place]/Cues Needed (Dressing Goal 1, OT)  independent  -LC  --     Time Frame (Dressing Goal 1, OT)  1 day;long term goal (LTG)  -LC  --     Progress/Outcome (Dressing Goal 1, OT)  goal met  -LC  --       User Key  (r) = Recorded By, (t) = Taken By, (c) = Cosigned By    Initials Name Provider Type Discipline    Amelia Ayala, PT Physical Therapist PT    LC Sarah Rowell, OT Occupational Therapist OT    Milly Butler, PT Student PT Student PT          Outcome Measures     Row Name 08/04/20 1047             How much help from another is currently needed...    Putting on and taking off regular lower body clothing?  4  -LC      Bathing (including washing, rinsing, and drying)  3  -LC      Toileting (which includes using toilet bed pan or urinal)  4  -LC      Putting on and taking off regular upper body clothing  4  -LC      Taking care of personal grooming (such as brushing teeth)  4  -LC      Eating meals  4  -LC      AM-PAC 6  Clicks Score (OT)  23  -         Functional Assessment    Outcome Measure Options  AM-PAC 6 Clicks Daily Activity (OT)  -        User Key  (r) = Recorded By, (t) = Taken By, (c) = Cosigned By    Initials Name Provider Type    Sarah Mckeon OT Occupational Therapist          Time Calculation:   Time Calculation- OT     Row Name 08/04/20 1049             Time Calculation- OT    OT Start Time  1049  -      OT Received On  08/04/20  -      OT Goal Re-Cert Due Date  08/14/20  -         Timed Charges    42376 - OT Self Care/Mgmt Minutes  11  -        User Key  (r) = Recorded By, (t) = Taken By, (c) = Cosigned By    Initials Name Provider Type    Sarah Mckeon OT Occupational Therapist        Therapy Suggested Charges     Code   Minutes Charges    82473 (CPT®) Hc Ot Neuromusc Re Education Ea 15 Min      93701 (CPT®) Hc Ot Ther Proc Ea 15 Min      25193 (CPT®) Hc Ot Therapeutic Act Ea 15 Min      49520 (CPT®) Hc Ot Manual Therapy Ea 15 Min      96809 (CPT®) Hc Ot Iontophoresis Ea 15 Min      83365 (CPT®) Hc Ot Elec Stim Ea-Per 15 Min      89080 (CPT®) Hc Ot Ultrasound Ea 15 Min      46536 (CPT®) Hc Ot Self Care/Mgmt/Train Ea 15 Min 11 1    Total  11 1        Therapy Charges for Today     Code Description Service Date Service Provider Modifiers Qty    69634755717 HC OT SELF CARE/MGMT/TRAIN EA 15 MIN 8/4/2020 Sarah Rowell, OT GO 1    [id number] HC OT EVAL LOW COMPLEXITY 3 8/4/2020 Sarah Rowell OT GO 1               OT Discharge Summary  Anticipated Discharge Disposition (OT): home with assist  Reason for Discharge: Independent, At baseline function  Outcomes Achieved: Able to achieve all goals within established timeline  Discharge Destination: Home with assist    Sarah Rowell OT  8/4/2020

## 2020-08-04 NOTE — ED PROVIDER NOTES
EMERGENCY DEPARTMENT ENCOUNTER      Pt Name: Avani Argueta  MRN: [id number]  YOB: 1940  Date of evaluation: 8/3/2020  Provider: Kristofer Faye DO    CHIEF COMPLAINT       Chief Complaint   Patient presents with   • Dizziness         HISTORY OF PRESENT ILLNESS  (Location/Symptom, Timing/Onset, Context/Setting, Quality, Duration, Modifying Factors, Severity.)   Avani Argueta is a 80 y.o. female who presents to the emergency department for evaluation of 3-4 complaints.  She notes around dinnertime she started having very mild left-sided frontal headache, started having some jagged shapes and blurriness from her left eye which was transient nature, resolved, then started noticing some generalized dizziness and lightheadedness.  No associated nausea or vomiting with these episodes.  She denies any sudden onset severe headache, denies any current vision changes as though symptoms did resolve.  Denies any unilateral weakness, numbness or tingling upper or lower extremities.  Patient was able to ambulate, has some general weakness from her right lower extremity which is chronic in nature, states her gait was normal.  She denies any history of stroke, TIA.  Does not endorse a history of carotid stenosis follows with Dr. Olivera with cardiology.  She denies any chest pain difficulty breathing.  Does note a history of hypertension, usually takes her blood pressure medications in the evening which she has not taken tonight.  She denies any fevers or chills, no recent illness, no abdominal pain, no urinary or bowel complaints.  Denies any history of recurrent vertigo.  She denies any other acute systemic complaints at this time.      Nursing notes were reviewed.    REVIEW OF SYSTEMS    (2-9 systems for level 4, 10 or more for level 5)   ROS:  General:  No fevers, no chills, no weakness  Cardiovascular:  No chest pain, no palpitations  Respiratory:  No shortness of breath, no cough, no  wheezing  Gastrointestinal:  No pain, no nausea, no vomiting, no diarrhea  Musculoskeletal:  No muscle pain, no joint pain  Skin:  No rash, no easy bruising  Neurologic:  No speech problems, + mild left-sided frontal headache, no extremity numbness, no extremity tingling, no extremity weakness  Psychiatric:  No anxiety  Genitourinary:  No dysuria, no hematuria    Except as noted above the remainder of the review of systems was reviewed and negative.       PAST MEDICAL HISTORY     Past Medical History:   Diagnosis Date   • Acid reflux    • Arthritis    • Kidney disorder          SURGICAL HISTORY       Past Surgical History:   Procedure Laterality Date   • GALLBLADDER SURGERY     • HYSTERECTOMY           CURRENT MEDICATIONS       Current Facility-Administered Medications:   •  niCARdipine (CARDENE) 20 mg in 200 mL NS (0.1 mg/mL) infusion, 5-15 mg/hr, Intravenous, Titrated, Kristofer Faye DO, Last Rate: 75 mL/hr at 08/04/20 0010, 7.5 mg/hr at 08/04/20 0010  •  sodium chloride 0.9 % flush 10 mL, 10 mL, Intravenous, PRN, Kristofer Faye DO    Current Outpatient Medications:   •  ASTAXANTHIN PO, Take  by mouth Daily., Disp: , Rfl:   •  flecainide (TAMBOCOR) 50 MG tablet, TAKE ONE TABLET BY MOUTH EVERY 12 HOURS, Disp: 180 tablet, Rfl: 0  •  folic acid (FOLVITE) 1 MG tablet, Take 1 mg by mouth 2 (Two) Times a Day., Disp: , Rfl:   •  KRILL OIL PO, Take 2 capsules by mouth Daily., Disp: , Rfl:   •  LOSARTAN POTASSIUM PO, Take 50 mg by mouth Daily., Disp: , Rfl:   •  Multiple Vitamins-Iron (CHLORELLA PO), Take 5 tablets by mouth Daily., Disp: , Rfl:   •  Multiple Vitamins-Minerals (MULTIVITAMIN ADULT PO), Take  by mouth Daily., Disp: , Rfl:   •  predniSONE (DELTASONE) 5 MG tablet, Take 5 mg by mouth Daily., Disp: , Rfl:   •  Probiotic Product (PROBIOTIC PO), Take 1 tablet by mouth Daily., Disp: , Rfl:   •  triamterene-hydrochlorothiazide (MAXZIDE-25) 37.5-25 MG per tablet, Take 1 tablet by mouth Daily., Disp: ,  Rfl:   •  Turmeric, Curcuma Longa, (CURCUMIN) powder, 1 tablet Daily. 1 tablet daily, Disp: , Rfl:   •  UBIQUINOL PO, Take  by mouth Daily., Disp: , Rfl:   •  Unable to find, 1 each Daily. Med Name: complete spare restore, Disp: , Rfl:   •  vitamin B-12 (CYANOCOBALAMIN) 1000 MCG tablet, Take 5,000 mcg by mouth Daily., Disp: , Rfl:   •  vitamin B-6 (PYRIDOXINE) 50 MG tablet, Take 100 mg by mouth Daily., Disp: , Rfl:   •  XARELTO 20 MG tablet, TAKE ONE TABLET BY MOUTH DAILY, Disp: 90 tablet, Rfl: 2    ALLERGIES     Penicillins    FAMILY HISTORY       Family History   Problem Relation Age of Onset   • Stroke Mother    • Diabetes Mother    • Heart disease Father    • Alcohol abuse Father    • Heart disease Sister           SOCIAL HISTORY       Social History     Socioeconomic History   • Marital status:      Spouse name: Not on file   • Number of children: Not on file   • Years of education: Not on file   • Highest education level: Not on file   Tobacco Use   • Smoking status: Never Smoker   • Smokeless tobacco: Never Used   Substance and Sexual Activity   • Alcohol use: No     Frequency: Never   • Drug use: No   • Sexual activity: Defer         PHYSICAL EXAM    (up to 7 for level 4, 8 or more for level 5)     Vitals:    08/03/20 2340 08/03/20 2345 08/03/20 2350 08/03/20 2355   BP: (!) 201/76 (!) 210/72 (!) 204/69 (!) 205/65   BP Location:       Patient Position:       Pulse: 89  93 93   Resp:       Temp:       TempSrc:       SpO2: 93%  90% 96%   Weight:       Height:           Physical Exam  General :Patient is awake, alert, oriented, in no acute distress, nontoxic appearing  HEENT: Pupils are equally round and reactive to light, EOMI, conjunctivae clear, sclerae white, there is no injection no icterus.  Oral mucosa is moist, no exudate. Uvula is midline  Neck: Neck is supple, full range of motion, trachea midline  Cardiac: Heart regular rate, rhythm, no murmurs, rubs, or gallops  Lungs: Lungs are clear to  auscultation, there is no wheezing, rhonchi, or rales. There is no use of accessory muscles.  Abdomen: Abdomen is soft, nontender, nondistended. There is no firm or pulsatile masses, no rebound rigidity or guarding.   Musculoskeletal: 5 out of 5 strength in all 4 extremities.  No focal muscle deficits are appreciated  Neuro: GCS 15, no focal neurological deficit, extraocular muscles are intact, patient does have 5 out of 5 strength bilateral upper extremities with normal finger-to-nose, no dysdiadochokinesia, no pronator drift.  Lower extremity is 4-5 strength right lower extremity 5 out of 5 left lower extremity which the patient notes is chronic in nature.  Gait not tested.  Motor intact, sensory intact, level of consciousness is normal, cerebellar function is normal  Dermatology: Skin is warm and dry  Psych: Mentation is grossly normal, cognition is grossly normal. Affect is appropriate.      DIAGNOSTIC RESULTS     EKG: All EKG's are interpreted by the Emergency Department Physician who either signs or Co-signs this chart in the absence of a cardiologist.    ECG 12 Lead   Final Result   Test Reason : Weak/Dizzy/AMS protocol   Blood Pressure : **/** mmHG   Vent. Rate : 085 BPM     Atrial Rate : 085 BPM      P-R Int : 172 ms          QRS Dur : 152 ms       QT Int : 430 ms       P-R-T Axes : 055 -70 043 degrees      QTc Int : 511 ms      Normal sinus rhythm   Possible Left atrial enlargement   Right bundle branch block   Left anterior fascicular block   Left ventricular hypertrophy with repolarization abnormality   Abnormal ECG   When compared with ECG of 13-APR-2016 18:46,   Vent. rate has increased BY  37 BPM   (RBBB and left anterior fascicular block) is now present   Confirmed by OLGA BONDS MD ([id number]) on 8/3/2020 9:29:15 PM      Referred By:  CHRISTINA           Confirmed By:OLGA BONDS MD          RADIOLOGY:   Non-plain film images such as CT, Ultrasound and MRI are read by the radiologist. Plain radiographic  images are visualized and preliminarily interpreted by the emergency physician with the below findings:      [] Radiologist's Report Reviewed:  CT Head Without Contrast   Final Result   Senescent changes without acute abnormality.               Signer Name: Omar Chaves MD    Signed: 8/3/2020 11:35 PM    Workstation Name: [id number]     [facility]      CT Angiogram Head         CT Angiogram Neck         XR Chest 1 View   Final Result   No acute cardiopulmonary findings.      Signer Name: Rianna Diaz MD    Signed: 8/3/2020 10:53 PM    Workstation Name: [id number]     [facility]      MRI Brain Without Contrast    (Results Pending)         ED BEDSIDE ULTRASOUND:   Performed by ED Physician - none    LABS:    I have reviewed and interpreted all of the currently available lab results from this visit (if applicable):  Results for orders placed or performed during the hospital encounter of 08/03/20   Comprehensive Metabolic Panel   Result Value Ref Range    Glucose 118 (H) 65 - 99 mg/dL    BUN 15 8 - 23 mg/dL    Creatinine 0.86 0.57 - 1.00 mg/dL    Sodium 132 (L) 136 - 145 mmol/L    Potassium 3.7 3.5 - 5.2 mmol/L    Chloride 93 (L) 98 - 107 mmol/L    CO2 28.0 22.0 - 29.0 mmol/L    Calcium 10.1 8.6 - 10.5 mg/dL    Total Protein 6.7 6.0 - 8.5 g/dL    Albumin 4.20 3.50 - 5.20 g/dL    ALT (SGPT) 24 1 - 33 U/L    AST (SGOT) 38 (H) 1 - 32 U/L    Alkaline Phosphatase 77 39 - 117 U/L    Total Bilirubin 0.6 0.0 - 1.2 mg/dL    eGFR Non African Amer 63 >60 mL/min/1.73    Globulin 2.5 gm/dL    A/G Ratio 1.7 g/dL    BUN/Creatinine Ratio 17.4 7.0 - 25.0    Anion Gap 11.0 5.0 - 15.0 mmol/L   Troponin   Result Value Ref Range    Troponin T <0.010 0.000 - 0.030 ng/mL   Magnesium   Result Value Ref Range    Magnesium 2.3 1.6 - 2.4 mg/dL   Urinalysis With Microscopic If Indicated (No Culture) - Urine, Clean Catch   Result Value Ref Range    Color, UA Yellow Yellow, Straw     Appearance, UA Turbid (A) Clear    pH, UA 8.0 5.0 - 8.0    Specific Gravity, UA 1.015 1.001 - 1.030    Glucose, UA Negative Negative    Ketones, UA Negative Negative    Bilirubin, UA Negative Negative    Blood, UA Negative Negative    Protein, UA Negative Negative    Leuk Esterase, UA Trace (A) Negative    Nitrite, UA Negative Negative    Urobilinogen, UA 0.2 E.U./dL 0.2 - 1.0 E.U./dL   CBC Auto Differential   Result Value Ref Range    WBC 8.87 3.40 - 10.80 10*3/mm3    RBC 5.02 3.77 - 5.28 10*6/mm3    Hemoglobin 15.8 12.0 - 15.9 g/dL    Hematocrit 45.8 34.0 - 46.6 %    MCV 91.2 79.0 - 97.0 fL    MCH 31.5 26.6 - 33.0 pg    MCHC 34.5 31.5 - 35.7 g/dL    RDW 13.4 12.3 - 15.4 %    RDW-SD 45.3 37.0 - 54.0 fl    MPV 10.3 6.0 - 12.0 fL    Platelets 242 140 - 450 10*3/mm3    Neutrophil % 55.6 42.7 - 76.0 %    Lymphocyte % 32.6 19.6 - 45.3 %    Monocyte % 10.0 5.0 - 12.0 %    Eosinophil % 0.8 0.3 - 6.2 %    Basophil % 0.7 0.0 - 1.5 %    Immature Grans % 0.3 0.0 - 0.5 %    Neutrophils, Absolute 4.93 1.70 - 7.00 10*3/mm3    Lymphocytes, Absolute 2.89 0.70 - 3.10 10*3/mm3    Monocytes, Absolute 0.89 0.10 - 0.90 10*3/mm3    Eosinophils, Absolute 0.07 0.00 - 0.40 10*3/mm3    Basophils, Absolute 0.06 0.00 - 0.20 10*3/mm3    Immature Grans, Absolute 0.03 0.00 - 0.05 10*3/mm3    nRBC 0.0 0.0 - 0.2 /100 WBC   Urinalysis, Microscopic Only - Urine, Clean Catch   Result Value Ref Range    RBC, UA 3-6 (A) None Seen, 0-2 /HPF    WBC, UA 6-12 (A) None Seen, 0-2 /HPF    Bacteria, UA None Seen None Seen, Trace /HPF    Squamous Epithelial Cells, UA 3-6 (A) None Seen, 0-2 /HPF    Hyaline Casts, UA 0-6 0 - 6 /LPF    Methodology Automated Microscopy    Light Blue Top   Result Value Ref Range    Extra Tube hold for add-on    Green Top (Gel)   Result Value Ref Range    Extra Tube Hold for add-ons.    Lavender Top   Result Value Ref Range    Extra Tube hold for add-on    Gold Top - SST   Result Value Ref Range    Extra Tube Hold for add-ons.      "    All other labs were within normal range or not returned as of this dictation.      EMERGENCY DEPARTMENT COURSE and DIFFERENTIAL DIAGNOSIS/MDM:   Vitals:    Vitals:    20 2340 20 2345 20 2350 20 2355   BP: (!) 201/76 (!) 210/72 (!) 204/69 (!) 205/65   BP Location:       Patient Position:       Pulse: 89  93 93   Resp:       Temp:       TempSrc:       SpO2: 93%  90% 96%   Weight:       Height:           ED Course as of Aug 04 0125   Mon Aug 03, 2020   2125 Duplex scan of extracranial arteries using B-mode, color flow, and/or spectral Doppler; bilateral   Order# [id number]   Reading physician: Owen Olivera MD Ordering physician: Owen Olivera MD Study date: 20  Patient Information     Patient Name  Avani Argueta MRN  [id number] Sex  Female  (Age)  1940 (80 y.o.)  Clinical Indication     Stenosis  Dx: Carotid artery stenosis, asymptomatic, bilateral [I65.23 (ICD-10-CM)]  Interpretation Summary     · Left internal carotid artery stenosis of 50-69%.  · Right internal carotid artery stenosis of 0-49%.     Patient Hx Of Height, Weight, and Vitals     Height Weight BSA (Calculated - sq m) BMI (kg/m2) Pulse BP  152.4 cm (60\") 88.9 kg (196 lb) 1.85 sq meters 38.36 64 130/56  Study Findings     • Right CCA Prox: No plaque visualized.   • Right CCA Dist: Smooth heterogeneous plaque present.   • Right ICA Prox: Smooth heterogeneous plaque present.   • Right ICA Mid: No plaque visualized.   • Right ICA Dist: No plaque visualized.   • Right ECA: Irregular heterogeneous plaque present.   • Right Vertebral: Antegrade flow noted.       • Left CCA Prox: No plaque visualized.   • Left CCA Dist: No plaque visualized.   • Left ICA Prox: Irregular calcified heterogeneous plaque present.   • Left ICA Mid: No plaque visualized.   • Left ICA Dist: No plaque visualized.   • Left ECA: Irregular calcific heterogeneous plaque present.   • Left Vertebral: Antegrade flow noted.     No BP in left arm per " patient.  Right BP was acquired in forearm after 4 attempts.  Study Impression     • Right ICA Prox: Imaging of the right ICA indicates 0-49% stenosis.      • Left ICA Prox: Imaging of the left ICA indicates 50-69% stenosis.        [AP]      ED Course User Index  [AP] Kristofer Faye,        Patient with lightheadedness dizziness, vision changes to the left eye which resolved prior to arrival.  Still endorses some dizziness and lightheadedness on arrival.  NIH of 0, no acute neurological deficit on my examination.  On arrival she does present hypertensive 217/113, has a history of underlying hypertension which usually pretty well treated.  Does have remote history of a carotid stenosis per the patient.  On review of her prior carotid evaluations from a few months ago seems her max percent carotid stenosis was 50%.  She does take Xarelto, not have any fall, head injury or trauma.  We did obtain imaging of the head, neck for further evaluation, IV, labs obtained.  We will give the patient her blood pressure medication.  Blood work labs and imaging reviewed as above.  No significant abnormalities on her CT or angiography studies, blood pressure is greatly improved on a Cardene drip, reevaluation neurological status is improved, still has some mild dizziness.  I discussed with her given her symptoms, hypertensive urgency with possible neurological insult we will plan for admission to the hospital for further work-up and evaluation.  Patient be given full dose aspirin, case discussed with our hospitalist team for admission.        MEDICATIONS ADMINISTERED IN ED:  Medications   sodium chloride 0.9 % flush 10 mL (has no administration in time range)   niCARdipine (CARDENE) 20 mg in 200 mL NS (0.1 mg/mL) infusion (7.5 mg/hr Intravenous Rate/Dose Change 8/4/20 0010)   losartan (COZAAR) tablet 50 mg (50 mg Oral Given 8/3/20 4626)   triamterene-hydrochlorothiazide (MAXZIDE-25) 37.5-25 MG per tablet 1 tablet (1 tablet  Oral Given 8/3/20 2245)   iopamidol (ISOVUE-370) 76 % injection 100 mL (75 mL Intravenous Given 8/3/20 2241)   ondansetron (ZOFRAN) injection 8 mg (8 mg Intravenous Given 8/3/20 2315)       PROCEDURES:  Procedures    CRITICAL CARE TIME    Total Critical Care time was 45 minutes, excluding separately reportable procedures.  Acute neurological deficit, hypertensive urgency/emergency requiring multiple interventions, re-evaluations.  There was a high probability of clinically significant/life threatening deterioration in the patient's condition which required my urgent intervention.      FINAL IMPRESSION      1. Hypertensive urgency    2. Dizziness    3. Vision changes          DISPOSITION/PLAN     ED Disposition     ED Disposition Condition Comment    Decision to Admit  Level of Care: Telemetry [5]   Diagnosis: Hypertensive urgency [481309]   Admitting Physician: LAINEY MIRANDA [[id number]]   Attending Physician: LAINEY MIRANDA [[id number]]   Bed Request Comments: STROKE BED            PATIENT REFERRED TO:  No follow-up provider specified.    DISCHARGE MEDICATIONS:     Medication List      ASK your doctor about these medications    ASTAXANTHIN PO     CHLORELLA PO     Curcumin powder     flecainide 50 MG tablet  Commonly known as:  TAMBOCOR  TAKE ONE TABLET BY MOUTH EVERY 12 HOURS     folic acid 1 MG tablet  Commonly known as:  FOLVITE     KRILL OIL PO     LOSARTAN POTASSIUM PO     MULTIVITAMIN ADULT PO     predniSONE 5 MG tablet  Commonly known as:  DELTASONE     PROBIOTIC PO     triamterene-hydrochlorothiazide 37.5-25 MG per tablet  Commonly known as:  MAXZIDE-25     UBIQUINOL PO     Unable to find     vitamin B-12 1000 MCG tablet  Commonly known as:  CYANOCOBALAMIN     vitamin B-6 50 MG tablet  Commonly known as:  PYRIDOXINE     Xarelto 20 MG tablet  Generic drug:  rivaroxaban  TAKE ONE TABLET BY MOUTH DAILY              Comment: Please note this report has been produced using speech recognition software.      Kristofer  Kana Faye DO  Attending Emergency Physician               Kristofer Faye DO  08/04/20 0124

## 2020-08-04 NOTE — PLAN OF CARE
Problem: Patient Care Overview  Goal: Plan of Care Review  Outcome: Ongoing (interventions implemented as appropriate)  Flowsheets (Taken 8/4/2020 1049 by Sarah Rowell, JACQUE)  Plan of Care Reviewed With: patient  Note:   SLP evaluation completed. Will sign-off as no deficits identified with speech, language or cognition at this time. Please see note for further details and recommendations.

## 2020-08-05 LAB
ASCENDING AORTA: 2.5 CM
BACTERIA SPEC AEROBE CULT: NORMAL
BH CV ECHO MEAS - AO MAX PG (FULL): 4.4 MMHG
BH CV ECHO MEAS - AO MAX PG: 10.6 MMHG
BH CV ECHO MEAS - AO MEAN PG (FULL): 2 MMHG
BH CV ECHO MEAS - AO MEAN PG: 6 MMHG
BH CV ECHO MEAS - AO ROOT AREA (BSA CORRECTED): 1.7
BH CV ECHO MEAS - AO ROOT AREA: 7.5 CM^2
BH CV ECHO MEAS - AO ROOT DIAM: 3.1 CM
BH CV ECHO MEAS - AO V2 MAX: 163 CM/SEC
BH CV ECHO MEAS - AO V2 MEAN: 115 CM/SEC
BH CV ECHO MEAS - AO V2 VTI: 39.4 CM
BH CV ECHO MEAS - ASC AORTA: 2.5 CM
BH CV ECHO MEAS - AVA(I,A): 2 CM^2
BH CV ECHO MEAS - AVA(I,D): 2 CM^2
BH CV ECHO MEAS - AVA(V,A): 2.4 CM^2
BH CV ECHO MEAS - AVA(V,D): 2.4 CM^2
BH CV ECHO MEAS - BSA(HAYCOCK): 2 M^2
BH CV ECHO MEAS - BSA(HAYCOCK): 2 M^2
BH CV ECHO MEAS - BSA: 1.9 M^2
BH CV ECHO MEAS - BSA: 1.9 M^2
BH CV ECHO MEAS - BZI_BMI: 34.8 KILOGRAMS/M^2
BH CV ECHO MEAS - BZI_BMI: 34.8 KILOGRAMS/M^2
BH CV ECHO MEAS - BZI_METRIC_HEIGHT: 157.5 CM
BH CV ECHO MEAS - BZI_METRIC_HEIGHT: 157.5 CM
BH CV ECHO MEAS - BZI_METRIC_WEIGHT: 86.2 KG
BH CV ECHO MEAS - BZI_METRIC_WEIGHT: 86.2 KG
BH CV ECHO MEAS - EDV(CUBED): 65.5 ML
BH CV ECHO MEAS - EDV(MOD-SP2): 67 ML
BH CV ECHO MEAS - EDV(MOD-SP4): 73 ML
BH CV ECHO MEAS - EDV(TEICH): 71.3 ML
BH CV ECHO MEAS - EF(CUBED): 70.9 %
BH CV ECHO MEAS - EF(MOD-BP): 65 %
BH CV ECHO MEAS - EF(MOD-SP2): 64.2 %
BH CV ECHO MEAS - EF(MOD-SP4): 65.8 %
BH CV ECHO MEAS - EF(TEICH): 63.1 %
BH CV ECHO MEAS - ESV(CUBED): 19 ML
BH CV ECHO MEAS - ESV(MOD-SP2): 24 ML
BH CV ECHO MEAS - ESV(MOD-SP4): 25 ML
BH CV ECHO MEAS - ESV(TEICH): 26.3 ML
BH CV ECHO MEAS - FS: 33.7 %
BH CV ECHO MEAS - IVS/LVPW: 1
BH CV ECHO MEAS - IVSD: 1 CM
BH CV ECHO MEAS - LA DIMENSION: 3.7 CM
BH CV ECHO MEAS - LA/AO: 1.2
BH CV ECHO MEAS - LAD MAJOR: 4.9 CM
BH CV ECHO MEAS - LAT PEAK E' VEL: 6.3 CM/SEC
BH CV ECHO MEAS - LATERAL E/E' RATIO: 13.7
BH CV ECHO MEAS - LV DIASTOLIC VOL/BSA (35-75): 39 ML/M^2
BH CV ECHO MEAS - LV IVRT: 0.08 SEC
BH CV ECHO MEAS - LV MASS(C)D: 126.8 GRAMS
BH CV ECHO MEAS - LV MASS(C)DI: 67.8 GRAMS/M^2
BH CV ECHO MEAS - LV MAX PG: 6.3 MMHG
BH CV ECHO MEAS - LV MEAN PG: 4 MMHG
BH CV ECHO MEAS - LV SYSTOLIC VOL/BSA (12-30): 13.4 ML/M^2
BH CV ECHO MEAS - LV V1 MAX: 125 CM/SEC
BH CV ECHO MEAS - LV V1 MEAN: 88.3 CM/SEC
BH CV ECHO MEAS - LV V1 VTI: 25.3 CM
BH CV ECHO MEAS - LVIDD: 4 CM
BH CV ECHO MEAS - LVIDS: 2.7 CM
BH CV ECHO MEAS - LVLD AP2: 7.1 CM
BH CV ECHO MEAS - LVLD AP4: 6.9 CM
BH CV ECHO MEAS - LVLS AP2: 5.3 CM
BH CV ECHO MEAS - LVLS AP4: 5.6 CM
BH CV ECHO MEAS - LVOT AREA (M): 3.1 CM^2
BH CV ECHO MEAS - LVOT AREA: 3.1 CM^2
BH CV ECHO MEAS - LVOT DIAM: 2 CM
BH CV ECHO MEAS - LVPWD: 0.98 CM
BH CV ECHO MEAS - MED PEAK E' VEL: 6.2 CM/SEC
BH CV ECHO MEAS - MEDIAL E/E' RATIO: 13.9
BH CV ECHO MEAS - MV A MAX VEL: 95.8 CM/SEC
BH CV ECHO MEAS - MV DEC SLOPE: 416 CM/SEC^2
BH CV ECHO MEAS - MV DEC TIME: 0.3 SEC
BH CV ECHO MEAS - MV E MAX VEL: 86.4 CM/SEC
BH CV ECHO MEAS - MV E/A: 0.9
BH CV ECHO MEAS - MV P1/2T MAX VEL: 93.4 CM/SEC
BH CV ECHO MEAS - MV P1/2T: 65.8 MSEC
BH CV ECHO MEAS - MVA P1/2T LCG: 2.4 CM^2
BH CV ECHO MEAS - MVA(P1/2T): 3.3 CM^2
BH CV ECHO MEAS - PA ACC SLOPE: 637.5 CM/SEC^2
BH CV ECHO MEAS - PA ACC TIME: 0.16 SEC
BH CV ECHO MEAS - PA MAX PG: 5.2 MMHG
BH CV ECHO MEAS - PA PR(ACCEL): 9.3 MMHG
BH CV ECHO MEAS - PA V2 MAX: 113.5 CM/SEC
BH CV ECHO MEAS - PI END-D VEL: 129 CM/SEC
BH CV ECHO MEAS - RAP SYSTOLE: 8 MMHG
BH CV ECHO MEAS - RVSP: 38 MMHG
BH CV ECHO MEAS - SI(AO): 159 ML/M^2
BH CV ECHO MEAS - SI(CUBED): 24.8 ML/M^2
BH CV ECHO MEAS - SI(LVOT): 42.5 ML/M^2
BH CV ECHO MEAS - SI(MOD-SP2): 23 ML/M^2
BH CV ECHO MEAS - SI(MOD-SP4): 25.7 ML/M^2
BH CV ECHO MEAS - SI(TEICH): 24 ML/M^2
BH CV ECHO MEAS - SV(AO): 297.4 ML
BH CV ECHO MEAS - SV(CUBED): 46.4 ML
BH CV ECHO MEAS - SV(LVOT): 79.5 ML
BH CV ECHO MEAS - SV(MOD-SP2): 43 ML
BH CV ECHO MEAS - SV(MOD-SP4): 48 ML
BH CV ECHO MEAS - SV(TEICH): 45 ML
BH CV ECHO MEAS - TAPSE (>1.6): 1.9 CM2
BH CV ECHO MEAS - TR MAX PG: 30 MMHG
BH CV ECHO MEAS - TR MAX VEL: 276 CM/SEC
BH CV ECHO MEASUREMENTS AVERAGE E/E' RATIO: 13.82
BH CV VAS BP LEFT ARM: NORMAL MMHG
BH CV XLRA - RV BASE: 3.5 CM
BH CV XLRA - RV LENGTH: 6.4 CM
BH CV XLRA - RV MID: 2.9 CM
BH CV XLRA - TDI S': 12.1 CM/SEC
BH CV XLRA MEAS LEFT CCA RATIO VEL: 96.2 CM/SEC
BH CV XLRA MEAS LEFT DIST CCA EDV: 7.5 CM/SEC
BH CV XLRA MEAS LEFT DIST CCA PSV: 96.8 CM/SEC
BH CV XLRA MEAS LEFT DIST ICA EDV: 10.5 CM/SEC
BH CV XLRA MEAS LEFT DIST ICA PSV: 65.1 CM/SEC
BH CV XLRA MEAS LEFT ICA RATIO VEL: 294 CM/SEC
BH CV XLRA MEAS LEFT ICA/CCA RATIO: 3.1
BH CV XLRA MEAS LEFT MID CCA EDV: 8.7 CM/SEC
BH CV XLRA MEAS LEFT MID CCA PSV: 109.1 CM/SEC
BH CV XLRA MEAS LEFT MID ICA EDV: 12.6 CM/SEC
BH CV XLRA MEAS LEFT MID ICA PSV: 145.4 CM/SEC
BH CV XLRA MEAS LEFT PROX CCA EDV: 12.2 CM/SEC
BH CV XLRA MEAS LEFT PROX CCA PSV: 154.5 CM/SEC
BH CV XLRA MEAS LEFT PROX ECA EDV: 12.6 CM/SEC
BH CV XLRA MEAS LEFT PROX ECA PSV: 232.6 CM/SEC
BH CV XLRA MEAS LEFT PROX ICA EDV: 23.6 CM/SEC
BH CV XLRA MEAS LEFT PROX ICA PSV: 295.4 CM/SEC
BH CV XLRA MEAS LEFT PROX SCLA PSV: 337 CM/SEC
BH CV XLRA MEAS LEFT VERTEBRAL A EDV: 10.5 CM/SEC
BH CV XLRA MEAS LEFT VERTEBRAL A PSV: 67.8 CM/SEC
BH CV XLRA MEAS RIGHT CCA RATIO VEL: 65.8 CM/SEC
BH CV XLRA MEAS RIGHT DIST CCA EDV: 6.9 CM/SEC
BH CV XLRA MEAS RIGHT DIST CCA PSV: 66.3 CM/SEC
BH CV XLRA MEAS RIGHT DIST ICA EDV: 11.3 CM/SEC
BH CV XLRA MEAS RIGHT DIST ICA PSV: 104.1 CM/SEC
BH CV XLRA MEAS RIGHT ICA RATIO VEL: 110 CM/SEC
BH CV XLRA MEAS RIGHT ICA/CCA RATIO: 1.7
BH CV XLRA MEAS RIGHT MID CCA EDV: 10.5 CM/SEC
BH CV XLRA MEAS RIGHT MID CCA PSV: 76.8 CM/SEC
BH CV XLRA MEAS RIGHT MID ICA EDV: 11.3 CM/SEC
BH CV XLRA MEAS RIGHT MID ICA PSV: 110 CM/SEC
BH CV XLRA MEAS RIGHT PROX CCA EDV: 12.2 CM/SEC
BH CV XLRA MEAS RIGHT PROX CCA PSV: 143.2 CM/SEC
BH CV XLRA MEAS RIGHT PROX ECA EDV: 32.7 CM/SEC
BH CV XLRA MEAS RIGHT PROX ECA PSV: 567.5 CM/SEC
BH CV XLRA MEAS RIGHT PROX ICA EDV: 10.8 CM/SEC
BH CV XLRA MEAS RIGHT PROX ICA PSV: 91.8 CM/SEC
BH CV XLRA MEAS RIGHT PROX SCLA PSV: 206.2 CM/SEC
BH CV XLRA MEAS RIGHT VERTEBRAL A PSV: 64.7 CM/SEC
ERYTHROCYTE [SEDIMENTATION RATE] IN BLOOD: 9 MM/HR (ref 0–30)
LEFT ARM BP: NORMAL MMHG
LEFT ATRIUM VOLUME INDEX: 19.8 ML/M^2
LEFT ATRIUM VOLUME: 37 ML
LV EF 2D ECHO EST: 65 %

## 2020-08-05 PROCEDURE — 85652 RBC SED RATE AUTOMATED: CPT | Performed by: STUDENT IN AN ORGANIZED HEALTH CARE EDUCATION/TRAINING PROGRAM

## 2020-08-05 PROCEDURE — 97116 GAIT TRAINING THERAPY: CPT

## 2020-08-05 PROCEDURE — G0378 HOSPITAL OBSERVATION PER HR: HCPCS

## 2020-08-05 PROCEDURE — 63710000001 PREDNISONE PER 5 MG: Performed by: PHYSICIAN ASSISTANT

## 2020-08-05 PROCEDURE — 99225 PR SBSQ OBSERVATION CARE/DAY 25 MINUTES: CPT | Performed by: INTERNAL MEDICINE

## 2020-08-05 RX ORDER — ACETAMINOPHEN 325 MG/1
650 TABLET ORAL EVERY 6 HOURS PRN
Status: DISCONTINUED | OUTPATIENT
Start: 2020-08-05 | End: 2020-08-06 | Stop reason: HOSPADM

## 2020-08-05 RX ORDER — CARVEDILOL 3.12 MG/1
3.12 TABLET ORAL 2 TIMES DAILY WITH MEALS
Status: DISCONTINUED | OUTPATIENT
Start: 2020-08-05 | End: 2020-08-06 | Stop reason: HOSPADM

## 2020-08-05 RX ORDER — LOSARTAN POTASSIUM 50 MG/1
50 TABLET ORAL 2 TIMES DAILY
Status: DISCONTINUED | OUTPATIENT
Start: 2020-08-05 | End: 2020-08-06 | Stop reason: HOSPADM

## 2020-08-05 RX ADMIN — CARVEDILOL 3.12 MG: 3.12 TABLET, FILM COATED ORAL at 09:10

## 2020-08-05 RX ADMIN — SODIUM CHLORIDE, PRESERVATIVE FREE 10 ML: 5 INJECTION INTRAVENOUS at 20:54

## 2020-08-05 RX ADMIN — LOSARTAN POTASSIUM 50 MG: 50 TABLET, FILM COATED ORAL at 20:57

## 2020-08-05 RX ADMIN — FLECAINIDE ACETATE TABLET 50 MG: 50 TABLET ORAL at 08:33

## 2020-08-05 RX ADMIN — ATORVASTATIN CALCIUM 80 MG: 40 TABLET, FILM COATED ORAL at 20:54

## 2020-08-05 RX ADMIN — MAGNESIUM OXIDE TAB 400 MG (241.3 MG ELEMENTAL MG) 400 MG: 400 (241.3 MG) TAB at 10:44

## 2020-08-05 RX ADMIN — PREDNISONE 5 MG: 5 TABLET ORAL at 08:33

## 2020-08-05 RX ADMIN — ACETAMINOPHEN 650 MG: 325 TABLET, FILM COATED ORAL at 21:41

## 2020-08-05 RX ADMIN — FOLIC ACID 1 MG: 1 TABLET ORAL at 20:57

## 2020-08-05 RX ADMIN — LOSARTAN POTASSIUM 25 MG: 25 TABLET, FILM COATED ORAL at 08:33

## 2020-08-05 RX ADMIN — FLECAINIDE ACETATE TABLET 50 MG: 50 TABLET ORAL at 20:57

## 2020-08-05 RX ADMIN — FOLIC ACID 1 MG: 1 TABLET ORAL at 08:33

## 2020-08-05 RX ADMIN — SODIUM CHLORIDE, PRESERVATIVE FREE 10 ML: 5 INJECTION INTRAVENOUS at 08:34

## 2020-08-05 RX ADMIN — CARVEDILOL 3.12 MG: 3.12 TABLET, FILM COATED ORAL at 17:33

## 2020-08-05 RX ADMIN — RIVAROXABAN 20 MG: 20 TABLET, FILM COATED ORAL at 17:33

## 2020-08-05 NOTE — THERAPY DISCHARGE NOTE
Patient Name: Avani Argueta  : 1940    MRN: [id number]                              Today's Date: 2020       Admit Date: 8/3/2020    Visit Dx:     ICD-10-CM ICD-9-CM   1. Hypertensive urgency I16.0 401.9   2. Dizziness R42 780.4   3. Vision changes H53.9 368.9   4. Impaired mobility and ADLs Z74.09 V49.89    Z78.9      Patient Active Problem List   Diagnosis   • Paroxysmal atrial fibrillation (CMS/HCC)   • RBBB   • Carotid artery disease (CMS/HCC)   • Essential hypertension   • Dyslipidemia   • GERD (gastroesophageal reflux disease)   • Arthritis   • Hypertensive urgency   • Dizziness   • Temporal arteritis (CMS/HCC)     Past Medical History:   Diagnosis Date   • Acid reflux    • Arthritis    • Kidney disorder      Past Surgical History:   Procedure Laterality Date   • GALLBLADDER SURGERY     • HYSTERECTOMY       General Information     Row Name 20 1554          PT Evaluation Time/Intention    Document Type  discharge treatment  -AA     Mode of Treatment  physical therapy  -AA     Row Name 20 155          General Information    Patient Profile Reviewed?  yes  -AA     Existing Precautions/Restrictions  fall  -AA     Row Name 20 155          Cognitive Assessment/Intervention- PT/OT    Orientation Status (Cognition)  oriented x 4  -AA     Row Name 20 155          Safety Issues, Functional Mobility    Impairments Affecting Function (Mobility)  strength  -AA       User Key  (r) = Recorded By, (t) = Taken By, (c) = Cosigned By    Initials Name Provider Type    AA Chelsea Phoenix, PT Physical Therapist        Mobility     Row Name 20 1551          Bed Mobility Assessment/Treatment    Bed Mobility Assessment/Treatment  bed mobility (all) activities  -AA     [place] Level (Bed Mobility)  independent  -AA     Row Name 20 1553          Transfer Assessment/Treatment    Comment (Transfers)  good safety and sequencing; STS from EOB and toilet  -AA     Row Name 20 1558           Sit-Stand Transfer    Sit-Stand [place] (Transfers)  independent  -AA     Assistive Device (Sit-Stand Transfers)  -- none  -AA     Row Name 08/05/20 1554          Gait/Stairs Assessment/Training    Gait/Stairs Assessment/Training  gait/ambulation independence;distance ambulated;gait pattern;gait deviations  -AA     [place] Level (Gait)  independent  -AA     Assistive Device (Gait)  -- none  -AA     Distance in Feet (Gait)  500'  -AA     Pattern (Gait)  swing-through  -AA     Deviations/Abnormal Patterns (Gait)  base of support, wide  -AA     Bilateral Gait Deviations  forward flexed posture  -AA     Comment (Gait/Stairs)  Pt ambulated with no AD independently with no LOB or VC for sequencing  -AA       User Key  (r) = Recorded By, (t) = Taken By, (c) = Cosigned By    Initials Name Provider Type    Chelsea Goode PT Physical Therapist        Obj/Interventions     Row Name 08/05/20 1554          Static Sitting Balance    Level of [place] (Unsupported Sitting, Static Balance)  independent  -AA     Sitting Position (Unsupported Sitting, Static Balance)  sitting on edge of bed  -AA     Time Able to Maintain Position (Unsupported Sitting, Static Balance)  more than 5 minutes  -AA     Row Name 08/05/20 1554          Static Standing Balance    Level of [place] (Supported Standing, Static Balance)  independent  -AA     Assistive Device Utilized (Supported Standing, Static Balance)  -- none  -AA     Row Name 08/05/20 1554          Dynamic Standing Balance    Level of [place], Reaches Outside Midline (Standing, Dynamic Balance)  independent  -AA     Assistive Device Utilized (Supported Standing, Dynamic Balance)  -- none  -AA       User Key  (r) = Recorded By, (t) = Taken By, (c) = Cosigned By    Initials Name Provider Type    Chelsea Goode PT Physical Therapist        Goals/Plan     Row Name 08/05/20 1551          Bed Mobility Goal 1 (PT)    Progress/Outcomes (Bed Mobility Goal 1, PT)   goal met  -AA     Row Name 08/05/20 1554          Transfer Goal 1 (PT)    Progress/Outcome (Transfer Goal 1, PT)  goal met  -AA     Row Name 08/05/20 1554          Gait Training Goal 1 (PT)    Progress/Outcome (Gait Training Goal 1, PT)  goal met  -AA     Row Name 08/05/20 1554          Patient Education Goal (PT)    Progress/Outcome (Patient Education Goal, PT)  goal met  -AA       User Key  (r) = Recorded By, (t) = Taken By, (c) = Cosigned By    Initials Name Provider Type    Chelsea Goode, PT Physical Therapist        Clinical Impression     Row Name 08/05/20 1554          Pain Assessment    Additional Documentation  Pain Scale: Numbers Pre/Post-Treatment (Group)  -SUPRIYA     Row Name 08/05/20 1554          Pain Scale: Numbers Pre/Post-Treatment    Pain Scale: Numbers, Pretreatment  0/10 - no pain  -AA     Pain Scale: Numbers, Post-Treatment  0/10 - no pain  -AA     Row Name 08/05/20 1554          Plan of Care Review    Plan of Care Reviewed With  patient;spouse  -AA     Progress  improving  -AA     Row Name 08/05/20 1554          Positioning and Restraints    Pre-Treatment Position  in bed  -AA     Post Treatment Position  chair  -AA     In Chair  notified nsg;sitting;with family/caregiver;call light within reach;encouraged to call for assist  -AA       User Key  (r) = Recorded By, (t) = Taken By, (c) = Cosigned By    Initials Name Provider Type    Chelsea Goode, PT Physical Therapist        Outcome Measures     Row Name 08/05/20 6974          How much help from another person do you currently need...    Turning from your back to your side while in flat bed without using bedrails?  4  -AA     Moving from lying on back to sitting on the side of a flat bed without bedrails?  4  -AA     Moving to and from a bed to a chair (including a wheelchair)?  4  -AA     Standing up from a chair using your arms (e.g., wheelchair, bedside chair)?  4  -AA     Climbing 3-5 steps with a railing?  3  -AA     To walk in hospital  room?  4  -AA     -Merged with [facility] 6 Clicks Score (PT)  23  -AA     Row Name 08/05/20 1554          Functional Assessment    Outcome Measure Options  -Merged with [facility] 6 Clicks Basic Mobility (PT)  -       User Key  (r) = Recorded By, (t) = Taken By, (c) = Cosigned By    Initials Name Provider Type    Chelsea Goode, PT Physical Therapist        Physical Therapy Education                 Title: PT OT SLP Therapies (In Progress)     Topic: Physical Therapy (Done)     Point: Mobility training (Done)     Description:   Instruct learner(s) on safety and technique for assisting patient out of bed, chair or wheelchair.  Instruct in the proper use of assistive devices, such as walker, crutches, cane or brace.              Patient Friendly Description:   It's important to get you on your feet again, but we need to do so in a way that is safe for you. Falling has serious consequences, and your personal safety is the most important thing of all.        When it's time to get out of bed, one of us or a family member will sit next to you on the bed to give you support.     If your doctor or nurse tells you to use a walker, crutches, a cane, or a brace, be sure you use it every time you get out of bed, even if you think you don't need it.    Learning Progress Summary           Patient Acceptance, E,D, DU,VU by SUPRIYA at 8/5/2020 1554    Acceptance, E, VU by TYRON at 8/4/2020 0855    Comment:  Pt educated about transfer training and plan of care.   Family Acceptance, E,D, DU,VU by SUPRIYA at 8/5/2020 1554                   Point: Home exercise program (Done)     Description:   Instruct learner(s) on appropriate technique for monitoring, assisting and/or progressing patient with therapeutic exercises and activities.              Learning Progress Summary           Patient Acceptance, E, VU by TYRON at 8/4/2020 0855    Comment:  Pt educated about transfer training and plan of care.                   Point: Body mechanics (Done)     Description:   Instruct  learner(s) on proper positioning and spine alignment for patient and/or caregiver during mobility tasks and/or exercises.              Learning Progress Summary           Patient Acceptance, E,D, DU,VU by SUPRIYA at 8/5/2020 1554    Acceptance, E, VU by TYRON at 8/4/2020 0855    Comment:  Pt educated about transfer training and plan of care.   Family Acceptance, E,D, DU,VU by SUPRIYA at 8/5/2020 1554                   Point: Precautions (Done)     Description:   Instruct learner(s) on prescribed precautions during mobility and gait tasks              Learning Progress Summary           Patient Acceptance, E,D, DU,VU by AA at 8/5/2020 1554    Acceptance, E, VU by TYRON at 8/4/2020 0855    Comment:  Pt educated about transfer training and plan of care.   Family Acceptance, E,D, DU,VU by SUPRIYA at 8/5/2020 1554                               User Key     Initials Effective Dates Name Provider Type [facility] 04/02/18 -  Chelsea Phoenix, PT Physical Therapist PT    TYRON 05/14/20 -  Milly Woodard, PT Student PT Student PT              PT Recommendation and Plan     Outcome Summary/Treatment Plan (PT)  Anticipated Discharge Disposition (PT): home with assist  Plan of Care Reviewed With: patient, spouse  Progress: improving  Outcome Summary: Pt perform bed mobility and transfers with independence.  Pt ambulated 500' with no AD independently with no LOB or VC for sequencing.  VSS.  PT services DC at this time and recommend DC home with spouse     Time Calculation:   PT Charges     Row Name 08/05/20 1553             Time Calculation    Start Time  1553  -AA      PT Received On  08/05/20  -        User Key  (r) = Recorded By, (t) = Taken By, (c) = Cosigned By    Initials Name Provider Type    Chelsea Goode, PT Physical Therapist        Therapy Charges for Today     Code Description Service Date Service Provider Modifiers Qty    81285185889 HC GAIT TRAINING EA 15 MIN 8/5/2020 Chelsea Phoenix, PT GP 1          PT G-Codes  Outcome  Measure Options: AM-PAC 6 Clicks Basic Mobility (PT)  AM-PAC 6 Clicks Score (PT): 23  AM-PAC 6 Clicks Score (OT): 23  Modified [place] Scale: 1 - No significant disability despite symptoms.  Able to carry out all usual duties and activities.    PT Discharge Summary  Anticipated Discharge Disposition (PT): home with assist  Reason for Discharge: All goals achieved  Outcomes Achieved: Able to achieve all goals within established timeline  Discharge Destination: Home with assist    Chelsea Phoenix, PT  8/5/2020

## 2020-08-05 NOTE — PLAN OF CARE
Pt states she feels better when ambulating after taking her first dose of coreg. BP's much improved this shift; possible discharge tomorrow  Problem: Patient Care Overview  Goal: Plan of Care Review  Outcome: Ongoing (interventions implemented as appropriate)  Flowsheets (Taken 8/5/2020 1801)  Progress: no change  Plan of Care Reviewed With: patient

## 2020-08-05 NOTE — PLAN OF CARE
Problem: Patient Care Overview  Goal: Plan of Care Review  Outcome: Ongoing (interventions implemented as appropriate)  Flowsheets (Taken 8/5/2020 4609)  Outcome Summary: Pt perform bed mobility and transfers with independence.  Pt ambulated 500' with no AD independently with no LOB or VC for sequencing.  VSS.  PT services DC at this time and recommend DC home with spouse

## 2020-08-05 NOTE — PROGRESS NOTES
"    [facility] Medicine Services  PROGRESS NOTE    Patient Name: Avani Argueta  : 1940  MRN: [id number]    Date of Admission: 8/3/2020  Primary Care Physician: Kristofer Manuel MD    Subjective   Subjective     CC: f/u migraine    HPI: BP up to 230 systolic overnight, and received IV hydralazine. Asymptomatic at that time. Anxious this morning. Doesn't want to leave until she \"knows her medicine will work.\"    Review of Systems  Gen- No fevers, chills  CV- No chest pain, palpitations  Resp- No cough, dyspnea  GI- No N/V/D, abd pain    Objective   Objective     Vital Signs:   Temp:  [97.8 °F (36.6 °C)-98.6 °F (37 °C)] 97.8 °F (36.6 °C)  Heart Rate:  [66-83] 66  Resp:  [16-18] 18  BP: (117-230)/(39-76) 123/44  Total (NIH Stroke Scale): 0     Physical Exam:  Constitutional: No acute distress, awake, alert  HENT: NCAT, mucous membranes moist  Respiratory: Clear to auscultation bilaterally, respiratory effort normal   Cardiovascular: RRR, no murmurs, rubs, or gallops, palpable pedal pulses bilaterally  Gastrointestinal: Positive bowel sounds, soft, nontender, nondistended  Musculoskeletal: No bilateral ankle edema  Psychiatric: Appropriate affect, cooperative  Neurologic: Oriented x 3, strength symmetric in all extremities, Cranial Nerves grossly intact to confrontation, speech clear  Skin: No rashes    Results Reviewed:  Results from last 7 days   Lab Units 20  0446 20   WBC 10*3/mm3 12.75* 8.87   HEMOGLOBIN g/dL 16.1* 15.8   HEMATOCRIT % 47.5* 45.8   PLATELETS 10*3/mm3 248 242     Results from last 7 days   Lab Units 20  0446 20   SODIUM mmol/L 131* 132*   POTASSIUM mmol/L 3.7 3.7   CHLORIDE mmol/L 93* 93*   CO2 mmol/L 24.0 28.0   BUN mg/dL 19 15   CREATININE mg/dL 0.89 0.86   GLUCOSE mg/dL 138* 118*   CALCIUM mg/dL 10.2 10.1   ALT (SGPT) U/L  --  24   AST (SGOT) U/L  --  38*   TROPONIN T ng/mL  --  <0.010     Estimated Creatinine Clearance: 50.5 " mL/min (by C-G formula based on SCr of 0.89 mg/dL).    Microbiology Results Abnormal     Procedure Component Value - Date/Time    Urine Culture - Urine, Urine, Clean Catch [[id number]] Collected:  08/03/20 2217    Lab Status:  Final result Specimen:  Urine, Clean Catch Updated:  08/05/20 1033     Urine Culture >100,000 CFU/mL Mixed Morris Isolated    Narrative:       Specimen contains mixed organisms of questionable pathogenicity which indicates contamination with commensal morris.  Further identification is unlikely to provide clinically useful information.  Suggest recollection.        CT head personally reviewed without acute disease. MRI brain personally reviewed without acute disease. Agree with interpretation.    I have reviewed the medications:  Scheduled Meds:  atorvastatin 80 mg Oral Nightly   carvedilol 3.125 mg Oral BID With Meals   flecainide 50 mg Oral Q12H   folic acid 1 mg Oral BID   losartan 50 mg Oral BID   magnesium oxide 400 mg Oral Daily   predniSONE 5 mg Oral Daily With Breakfast   rivaroxaban 20 mg Oral Daily With Dinner   sodium chloride 10 mL Intravenous Q12H     Continuous Infusions:   PRN Meds:.ondansetron **OR** ondansetron  •  sodium chloride  •  sodium chloride    Assessment/Plan   Assessment & Plan     Active Hospital Problems    Diagnosis  POA   • Hypertensive urgency [I16.0]  Yes   • Dizziness [R42]  Yes   • Temporal arteritis (CMS/HCC) [M31.6]  Yes   • Paroxysmal atrial fibrillation (CMS/HCC) [I48.0]  Yes   • Carotid artery disease (CMS/HCC) [I77.9]  Yes      Resolved Hospital Problems   No resolved problems to display.        Brief Hospital Course to date:  Ms. Argueta is a 80 y.o. female with a medical hx significant for PAF on Xarelto, HTN, history of temporal arteritis and current who presented to [facility] ED via EMS for complaints of a headache, vision changes, dizziness and elevated blood pressure. This is my first day assessing patient's active medical issues.     Headache / Dizziness    Ocular migraine ruled out for CVA/TIA  - CT head negative, CTA head / neck shows advanced atherosclerotic changes of the carotid bifurcation on the left and left subclavian artery. MRI brain negative.  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  - Start mag ox.     Hypertensive urgency   - Has had issues with labile BP particularly with hypotension in mornings. Given marked HTN overnight will increase her losartan to 50mg BID and add coreg 3.125 BID and watch closely.     Paroxysmal atrial fibrillation   - CHADS-VASc 5  - Continue flecainide, xarelto.     Hx of Temporal arteritis   - continue prednisone     This patient's problems and plans were partially entered by my partner and updated as appropriate by me 08/05/20.    Dispo: Home in am if bp controlled off IV medications.     DVT prophylaxis:  SCDs     CODE STATUS:   Code Status and Medical Interventions:   Ordered at: 08/04/20 0232     Level Of Support Discussed With:    Patient     Code Status:    CPR     Medical Interventions (Level of Support Prior to Arrest):    Full         Electronically signed by Brisa Vasquez II, DO, 08/05/20, 12:25.

## 2020-08-05 NOTE — PROGRESS NOTES
Continued Stay Note  [facility]     Patient Name: Avani Argueta  MRN: [id number]  Today's Date: 8/5/2020    Admit Date: 8/3/2020    Discharge Plan     Row Name 08/05/20 1229       Plan    Plan  Home    Patient/Family in Agreement with Plan  yes    Plan Comments  Met with patient in the room.  She is doing better and is planning to go home tomorrow.  Denies discharge needs.  Following     Final Discharge Disposition Code  01 - home or self-care        Discharge Codes    No documentation.             Swetha Jeronimo RN

## 2020-08-06 VITALS
BODY MASS INDEX: 34.97 KG/M2 | SYSTOLIC BLOOD PRESSURE: 131 MMHG | HEART RATE: 60 BPM | RESPIRATION RATE: 18 BRPM | TEMPERATURE: 97.8 F | HEIGHT: 62 IN | OXYGEN SATURATION: 93 % | DIASTOLIC BLOOD PRESSURE: 54 MMHG | WEIGHT: 190.04 LBS

## 2020-08-06 PROBLEM — R42 DIZZINESS: Status: RESOLVED | Noted: 2020-08-04 | Resolved: 2020-08-06

## 2020-08-06 PROBLEM — I16.0 HYPERTENSIVE URGENCY: Status: RESOLVED | Noted: 2020-08-04 | Resolved: 2020-08-06

## 2020-08-06 PROBLEM — M31.6 TEMPORAL ARTERITIS (HCC): Status: RESOLVED | Noted: 2020-08-04 | Resolved: 2020-08-06

## 2020-08-06 PROCEDURE — 99217 PR OBSERVATION CARE DISCHARGE MANAGEMENT: CPT | Performed by: INTERNAL MEDICINE

## 2020-08-06 PROCEDURE — G0378 HOSPITAL OBSERVATION PER HR: HCPCS

## 2020-08-06 PROCEDURE — 63710000001 PREDNISONE PER 5 MG: Performed by: PHYSICIAN ASSISTANT

## 2020-08-06 RX ORDER — LOSARTAN POTASSIUM 50 MG/1
50 TABLET ORAL DAILY
Qty: 60 TABLET | Refills: 0 | Status: SHIPPED | OUTPATIENT
Start: 2020-08-06

## 2020-08-06 RX ORDER — CARVEDILOL 3.12 MG/1
3.12 TABLET ORAL 2 TIMES DAILY WITH MEALS
Qty: 60 TABLET | Refills: 0 | Status: SHIPPED | OUTPATIENT
Start: 2020-08-06 | End: 2020-08-06 | Stop reason: HOSPADM

## 2020-08-06 RX ORDER — LOSARTAN POTASSIUM 50 MG/1
50 TABLET ORAL 2 TIMES DAILY
Qty: 60 TABLET | Refills: 0 | Status: SHIPPED | OUTPATIENT
Start: 2020-08-06 | End: 2020-08-06

## 2020-08-06 RX ADMIN — FOLIC ACID 1 MG: 1 TABLET ORAL at 08:37

## 2020-08-06 RX ADMIN — FLECAINIDE ACETATE TABLET 50 MG: 50 TABLET ORAL at 08:37

## 2020-08-06 RX ADMIN — CARVEDILOL 3.12 MG: 3.12 TABLET, FILM COATED ORAL at 08:37

## 2020-08-06 RX ADMIN — LOSARTAN POTASSIUM 50 MG: 50 TABLET, FILM COATED ORAL at 08:37

## 2020-08-06 RX ADMIN — MAGNESIUM OXIDE TAB 400 MG (241.3 MG ELEMENTAL MG) 400 MG: 400 (241.3 MG) TAB at 08:37

## 2020-08-06 RX ADMIN — PREDNISONE 5 MG: 5 TABLET ORAL at 08:37

## 2020-08-06 NOTE — SIGNIFICANT NOTE
Patient refusing to leave on losartan 50 mg BID saying it is too much medication for her despite near perfect blood pressure control here. I have reduced her dose back to daily dosing per her demands. This can be addressed w/ her pcp as outpatient.

## 2020-08-06 NOTE — DISCHARGE SUMMARY
[facility] Medicine Services  DISCHARGE SUMMARY    Patient Name: Avani Argueta  : 1940  MRN: [id number]    Date of Admission: 8/3/2020  9:18 PM  Date of Discharge: 2020  Primary Care Physician: Kristofer Manuel MD    Consults     Date and Time Order Name Status Description    2020 0418 Inpatient Neurology Consult Stroke Completed           Hospital Course     Presenting Problem:   Hypertensive urgency [I16.0]    Active Hospital Problems    Diagnosis  POA   • Paroxysmal atrial fibrillation (CMS/HCC) [I48.0]  Yes   • Carotid artery disease (CMS/HCC) [I77.9]  Yes      Resolved Hospital Problems    Diagnosis Date Resolved POA   • Hypertensive urgency [I16.0] 2020 Yes   • Dizziness [R42] 2020 Yes   • Temporal arteritis (CMS/HCC) [M31.6] 2020 Yes          Hospital Course:  Ms. Argueta is a 80 y.o. female with a medical hx significant for PAF on Xarelto, HTN, history of temporal arteritis and current who presented to [facility] ED via EMS for complaints of a headache, vision changes, dizziness and elevated blood pressure. This is my first day assessing patient's active medical issues.     Headache / Dizziness   Ocular migraine ruled out for CVA/TIA  - Upon arrival she underwent extensive imaging eval with CT head negative, CTA head / neck shows advanced atherosclerotic changes of the carotid bifurcation on the left and left subclavian artery. MRI brain negative. Carotid duplex w/ known moderate carotid disease comparable to CTA. Echo completed with results pending.  - Neurology has seen and feels c/w occular migraine. Start mag ox at d/c.     Hypertensive urgency   - Has had issues with labile BP particularly with hypotension in mornings. She was placed on PO losartan BID and did well. Continue at d/c.    Discharge Follow Up Recommendations for outpatient labs/diagnostics:   PCP w/ bp check in 1-2 weeks.    Day of Discharge     HPI: Lying in bed. Doing much better. No  dizziness/blurry vision.    Review of Systems  Gen- No fevers, chills  CV- No chest pain, palpitations  Resp- No cough, dyspnea  GI- No N/V/D, abd pain    Vital Signs:   Temp:  [97.4 °F (36.3 °C)-98.2 °F (36.8 °C)] 97.8 °F (36.6 °C)  Heart Rate:  [60-82] 60  Resp:  [16-18] 18  BP: (108-160)/(42-70) 131/54     Physical Exam:  Constitutional: No acute distress, awake, alert  HENT: NCAT, mucous membranes moist  Respiratory: Clear to auscultation bilaterally, respiratory effort normal   Cardiovascular: RRR, no murmurs, rubs, or gallops, palpable pedal pulses bilaterally  Gastrointestinal: Positive bowel sounds, soft, nontender, nondistended  Musculoskeletal: No bilateral ankle edema  Psychiatric: Appropriate affect, cooperative  Neurologic: Oriented x 3, strength symmetric in all extremities, Cranial Nerves grossly intact to confrontation, speech clear  Skin: No rashes    Pertinent  and/or Most Recent Results     Results from last 7 days   Lab Units 08/04/20 0446 08/03/20 2128   WBC 10*3/mm3 12.75* 8.87   HEMOGLOBIN g/dL 16.1* 15.8   HEMATOCRIT % 47.5* 45.8   PLATELETS 10*3/mm3 248 242   SODIUM mmol/L 131* 132*   POTASSIUM mmol/L 3.7 3.7   CHLORIDE mmol/L 93* 93*   CO2 mmol/L 24.0 28.0   BUN mg/dL 19 15   CREATININE mg/dL 0.89 0.86   GLUCOSE mg/dL 138* 118*   CALCIUM mg/dL 10.2 10.1     Results from last 7 days   Lab Units 08/03/20 2128   BILIRUBIN mg/dL 0.6   ALK PHOS U/L 77   ALT (SGPT) U/L 24   AST (SGOT) U/L 38*     Results from last 7 days   Lab Units 08/04/20 0446   CHOLESTEROL mg/dL 220*   TRIGLYCERIDES mg/dL 83   HDL CHOL mg/dL 78*     Results from last 7 days   Lab Units 08/04/20  0446 08/03/20  2128   TSH uIU/mL 0.621  --    HEMOGLOBIN A1C % 5.40  --    TROPONIN T ng/mL  --  <0.010       Brief Urine Lab Results  (Last result in the past 365 days)      Color   Clarity   Blood   Leuk Est   Nitrite   Protein   CREAT   Urine HCG        08/03/20 2217 Yellow Turbid Negative Trace Negative Negative                Microbiology Results Abnormal     Procedure Component Value - Date/Time    Urine Culture - Urine, Urine, Clean Catch [[id number]] Collected:  08/03/20 2217    Lab Status:  Final result Specimen:  Urine, Clean Catch Updated:  08/05/20 1033     Urine Culture >100,000 CFU/mL Mixed Morris Isolated    Narrative:       Specimen contains mixed organisms of questionable pathogenicity which indicates contamination with commensal morris.  Further identification is unlikely to provide clinically useful information.  Suggest recollection.          Imaging Results (All)     Procedure Component Value Units Date/Time    MRI Brain Without Contrast [[id number]] Collected:  08/04/20 0404     Updated:  08/04/20 0912    Narrative:       MRI Brain WO     INDICATION:    Dizziness and lightheadedness. In the emergency Department. Carotid stenosis. Hypertensive urgency.    TECHNIQUE:    Multiplanar imaging of the brain was performed with short and long TR    FINDINGS:    Brain MRI:  No hemorrhage. No acute stroke. No mass lesion. No significant hydrocephalus.    This is a preliminary wet read by Dr. Omar Chaves at 0400 hours. Final interpretation will be provided by neuroradiology. Please refer to final interpretation for detailed findings and any further recommendations.    Final interpretation:    Generalized atrophy with mild chronic ischemic changes around the ventricles. No acute findings. Final report dictated on 8/4/2020 at 9:09 AM    Signer Name: Burton Jurado MD   Signed: 8/4/2020 9:10 AM   Workstation Name: [id number]    [facility]    CT Angiogram Neck [[id number]] Collected:  08/04/20 0003     Updated:  08/04/20 0852    Narrative:       CTA Neck, CTA Head     INDICATION:    Dizziness and lightheadedness. Vision changes. Carotid stenosis.    TECHNIQUE:CT angiogram of the head and neck with 100 cc of Isovue-300 IV contrast. 3-D reconstructions were obtained and reviewed.  Evaluation for a significant  carotid arterial stenosis is based on the NASCET criteria. Radiation dose reduction  techniques included automated exposure control or exposure modulation based on body size. Count of known CT and cardiac nuc med studies performed in previous 12 months: 0    Results:    Head and neck CTA:    Neck - Patent cervical CCA/ICA/vertebral arteries. No apparent dissection. There is moderately advanced to advanced atherosclerotic change of the carotid bifurcation on the left. Evidence of prior surgery of the right neck and perhaps right carotid  artery. Dominant left vertebral artery. Atherosclerotic change of the origin of the left subclavian artery.    Head - No hemodynamically critical stenosis or vessel cut off. No apparent aneurysm. No dural sinus occlusion. No large completed infarct.    This is a preliminary wet read by Dr. Omar Chaves at 2358 hours. Final interpretation will be provided by neuroradiology. Please refer to final interpretation for detailed findings and any further recommendations.    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 The degree of stenosis appears to be greater than 75%. There is calcified plaque at both vertebral artery origins with stenosis of at least 50% on  each side. Through the cervical spine the left vertebral is widely patent. There is soft plaque causing circumferential narrowing of the right vertebral as it enters the spine with degree of narrowing approximately 60-70%. Both distal vertebral arteries  are patent. There is nonocclusive calcified plaque in the left vertebral at the skull base. Multiple vascular clips are seen in the right side of the neck.    There is plaque at both carotid bifurcations. There is stenosis of greater than 80% at the right external carotid artery origin. There is calcified plaque in the left carotid bulb without hemodynamically significant narrowing. The cervical  internal  carotids are patent with mild bilateral siphon disease.    The intracranial circulation shows no evidence of aneurysm, vascular malformation or major branch vessel occlusion. Major dural venous sinuses are patent.    Final report dictated on 8/4/2020 at 8:49 AM    Signer Name: Burton Jurado MD   Signed: 8/4/2020 8:50 AM   Workstation Name: [id number]    [facility]    CT Angiogram Head [[id number]] Collected:  08/04/20 0003     Updated:  08/04/20 0852    Narrative:       CTA Neck, CTA Head     INDICATION:    Dizziness and lightheadedness. Vision changes. Carotid stenosis.    TECHNIQUE:CT angiogram of the head and neck with 100 cc of Isovue-300 IV contrast. 3-D reconstructions were obtained and reviewed.  Evaluation for a significant carotid arterial stenosis is based on the NASCET criteria. Radiation dose reduction  techniques included automated exposure control or exposure modulation based on body size. Count of known CT and cardiac nuc med studies performed in previous 12 months: 0    Results:    Head and neck CTA:    Neck - Patent cervical CCA/ICA/vertebral arteries. No apparent dissection. There is moderately advanced to advanced atherosclerotic change of the carotid bifurcation on the left. Evidence of prior surgery of the right neck and perhaps right carotid  artery. Dominant left vertebral artery. Atherosclerotic change of the origin of the left subclavian artery.    Head - No hemodynamically critical stenosis or vessel cut off. No apparent aneurysm. No dural sinus occlusion. No large completed infarct.    This is a preliminary wet read by Dr. Omar Chaves at 2358 hours. Final interpretation will be provided by neuroradiology. Please refer to final interpretation for detailed findings and any further recommendations.    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 The degree of stenosis appears  to be greater than 75%. There is calcified plaque at both vertebral artery origins with stenosis of at least 50% on  each side. Through the cervical spine the left vertebral is widely patent. There is soft plaque causing circumferential narrowing of the right vertebral as it enters the spine with degree of narrowing approximately 60-70%. Both distal vertebral arteries  are patent. There is nonocclusive calcified plaque in the left vertebral at the skull base. Multiple vascular clips are seen in the right side of the neck.    There is plaque at both carotid bifurcations. There is stenosis of greater than 80% at the right external carotid artery origin. There is calcified plaque in the left carotid bulb without hemodynamically significant narrowing. The cervical internal  carotids are patent with mild bilateral siphon disease.    The intracranial circulation shows no evidence of aneurysm, vascular malformation or major branch vessel occlusion. Major dural venous sinuses are patent.    Final report dictated on 8/4/2020 at 8:49 AM    Signer Name: Burton Jurado MD   Signed: 8/4/2020 8:50 AM   Workstation Name: [id number]    [facility]    CT Head Without Contrast [[id number]] Collected:  08/03/20 2335     Updated:  08/03/20 2337    Narrative:       CT Head WO    HISTORY:   80-year-old female with dizziness and lightheadedness.    TECHNIQUE:   Axial unenhanced head CT. Radiation dose reduction techniques included automated exposure control or exposure modulation based on body size. Count of known CT and cardiac nuc med studies performed in previous 12 months: 0.     Time of scan: 2317 hours    COMPARISON:   None.    FINDINGS:   No intracranial hemorrhage, mass, or infarct. No hydrocephalus or extra-axial fluid collection. There are senescent changes, including volume loss and nonspecific white matter change, but no acute abnormality is seen. The skull base, calvarium, and  extracranial soft tissues are  normal.      Impression:       Senescent changes without acute abnormality.          Signer Name: Omar Chaves MD   Signed: 8/3/2020 11:35 PM   Workstation Name: [id number]    [facility]    XR Chest 1 View [[id number]] Collected:  08/03/20 2253     Updated:  08/03/20 2255    Narrative:       CR Chest 1 Vw    INDICATION:   80-year-old with history of hypertension. Weakness and dizziness with headache for one day     COMPARISON:    Portable chest 4/13/2016    FINDINGS:  Single portable AP view(s) of the chest.  Heart size is at the upper limits of normal. Stable tortuous atherosclerotic aorta. There are stable benign calcified granulomatous changes on the left. No acute pulmonary density or effusion      Impression:       No acute cardiopulmonary findings.    Signer Name: Rianna Diaz MD   Signed: 8/3/2020 10:53 PM   Workstation Name: [id number]    [facility]          Results for orders placed during the hospital encounter of 08/03/20   Duplex Carotid Ultrasound CAR    Narrative · Right internal carotid artery stenosis of 0-49%.  · Left internal carotid artery stenosis of 50-69%.          Results for orders placed during the hospital encounter of 08/03/20   Duplex Carotid Ultrasound CAR    Narrative · Right internal carotid artery stenosis of 0-49%.  · Left internal carotid artery stenosis of 50-69%.          Results for orders placed during the hospital encounter of 08/03/20   Adult Transthoracic Echo Complete W/ Cont if Necessary Per Protocol (With Agitated Saline)    Narrative · Estimated EF = 65%.  · The cardiac valves are anatomically and functionally normal.          Plan for Follow-up of Pending Labs/Results: None    Discharge Details        Discharge Medications      New Medications      Instructions Start Date   magnesium oxide 400 (241.3 Mg) MG tablet tablet  Commonly known as:  MAGOX   400 mg, Oral, Daily         Changes to Medications       Instructions Start Date   losartan 50 MG tablet  Commonly known as:  COZAAR  What changed:  medication strength   50 mg, Oral, Daily         Continue These Medications      Instructions Start Date   ASTAXANTHIN PO   Oral, Daily      CHLORELLA PO   5 tablets, Oral, Daily      Curcumin powder   1 tablet, Does not apply, Daily, 1 tablet daily       flecainide 50 MG tablet  Commonly known as:  TAMBOCOR   TAKE ONE TABLET BY MOUTH EVERY 12 HOURS      folic acid 1 MG tablet  Commonly known as:  FOLVITE   1 mg, Oral, 2 Times Daily      KRILL OIL PO   2 capsules, Oral, Daily      MULTIVITAMIN ADULT PO   Oral, Daily      predniSONE 5 MG tablet  Commonly known as:  DELTASONE   5 mg, Oral, Daily      PROBIOTIC PO   1 tablet, Oral, Daily      UBIQUINOL PO   Oral, Daily      Unable to find   1 each, Daily, Med Name: complete spare restore       vitamin B-12 1000 MCG tablet  Commonly known as:  CYANOCOBALAMIN   5,000 mcg, Oral, Daily      vitamin B-6 50 MG tablet  Commonly known as:  PYRIDOXINE   100 mg, Oral, Daily      Xarelto 20 MG tablet  Generic drug:  rivaroxaban   TAKE ONE TABLET BY MOUTH DAILY         Stop These Medications    triamterene-hydrochlorothiazide 37.5-25 MG per tablet  Commonly known as:  MAXZIDE-25            Allergies   Allergen Reactions   • Penicillins Rash         Discharge Disposition:  Home or Self Care    Diet:  Hospital:  Diet Order   Procedures   • Diet Regular; Cardiac          CODE STATUS:    Code Status and Medical Interventions:   Ordered at: 08/04/20 0232     Level Of Support Discussed With:    Patient     Code Status:    CPR     Medical Interventions (Level of Support Prior to Arrest):    Full       Future Appointments   Date Time Provider Department Center   6/7/2021 11:30 AM Tana Segura APRN [facility] None       Additional Instructions for the Follow-ups that You Need to Schedule     Discharge Follow-up with PCP   As directed       Currently Documented PCP:    Kristofer Manuel MD     PCP Phone Number:    [phone]     Follow Up Details:  1-2 week hospital follow up                     Electronically signed by Brisa Vasquez II, DO, 08/06/20, 9:41 AM.      Time Spent on Discharge:  I spent  34  minutes on this discharge activity which included: face-to-face encounter with the patient, reviewing the data in the system, coordination of the care with the nursing staff as well as consultants, documentation, and entering orders.

## 2020-08-06 NOTE — PLAN OF CARE
Problem: Patient Care Overview  Goal: Plan of Care Review  Outcome: Ongoing (interventions implemented as appropriate)  Flowsheets (Taken 8/6/2020 9311)  Progress: improving  Plan of Care Reviewed With: patient  Outcome Summary: VSS on RA.  No acute issues noted throughout shift.  Plan to discharge home today.  NSR on monitor.  Will continue to monitor.

## 2020-08-06 NOTE — PROGRESS NOTES
Continued Stay Note  [facility]     Patient Name: Avani Argueta  MRN: [id number]  Today's Date: 8/6/2020    Admit Date: 8/3/2020    Discharge Plan     Row Name 08/06/20 1359       Plan    Plan  Home    Patient/Family in Agreement with Plan  yes    Plan Comments  Met with patient in the room.  States she wants to stay until everything is resolved.  Discharge orders in the computer.  No needs.  Following     Final Discharge Disposition Code  01 - home or self-care        Discharge Codes    No documentation.       Expected Discharge Date and Time     Expected Discharge Date Expected Discharge Time    Aug 6, 2020             Swetha Jeronimo RN

## 2020-08-06 NOTE — PROGRESS NOTES
Case Management Discharge Note      Final Note: Home    Provided Post Acute Provider List?: N/A  N/A Provider List Comment: No post discharge services anticipated    Destination      No service has been selected for the patient.      Durable Medical Equipment      No service has been selected for the patient.      Dialysis/Infusion      No service has been selected for the patient.      Home Medical Care      No service has been selected for the patient.      Therapy      No service has been selected for the patient.      Community Resources      No service has been selected for the patient.             Final Discharge Disposition Code: 01 - home or self-care

## 2020-10-29 RX ORDER — FLECAINIDE ACETATE 50 MG/1
TABLET ORAL
Qty: 180 TABLET | Refills: 2 | Status: SHIPPED | OUTPATIENT
Start: 2020-10-29

## 2021-04-20 RX ORDER — RIVAROXABAN 20 MG/1
TABLET, FILM COATED ORAL
Qty: 90 TABLET | Refills: 1 | Status: SHIPPED | OUTPATIENT
Start: 2021-04-20 | End: 2021-10-21

## 2021-10-21 RX ORDER — RIVAROXABAN 20 MG/1
TABLET, FILM COATED ORAL
Qty: 90 TABLET | Refills: 0 | Status: SHIPPED | OUTPATIENT
Start: 2021-10-21 | End: 2022-01-19